# Patient Record
Sex: FEMALE | Race: WHITE | Employment: OTHER | URBAN - METROPOLITAN AREA
[De-identification: names, ages, dates, MRNs, and addresses within clinical notes are randomized per-mention and may not be internally consistent; named-entity substitution may affect disease eponyms.]

---

## 2021-01-01 ENCOUNTER — APPOINTMENT (OUTPATIENT)
Dept: CT IMAGING | Age: 76
DRG: 871 | End: 2021-01-01
Payer: MEDICARE

## 2021-01-01 ENCOUNTER — APPOINTMENT (OUTPATIENT)
Dept: MRI IMAGING | Age: 76
DRG: 871 | End: 2021-01-01
Payer: MEDICARE

## 2021-01-01 ENCOUNTER — APPOINTMENT (OUTPATIENT)
Dept: GENERAL RADIOLOGY | Age: 76
DRG: 871 | End: 2021-01-01
Payer: MEDICARE

## 2021-01-01 ENCOUNTER — HOSPITAL ENCOUNTER (INPATIENT)
Age: 76
LOS: 2 days | DRG: 871 | End: 2021-01-08
Attending: EMERGENCY MEDICINE | Admitting: FAMILY MEDICINE
Payer: MEDICARE

## 2021-01-01 VITALS
OXYGEN SATURATION: 48 % | HEIGHT: 67 IN | TEMPERATURE: 97 F | DIASTOLIC BLOOD PRESSURE: 43 MMHG | BODY MASS INDEX: 18.87 KG/M2 | RESPIRATION RATE: 24 BRPM | WEIGHT: 120.25 LBS | SYSTOLIC BLOOD PRESSURE: 94 MMHG | HEART RATE: 73 BPM

## 2021-01-01 DIAGNOSIS — I48.91 ATRIAL FIBRILLATION WITH RVR (HCC): Primary | ICD-10-CM

## 2021-01-01 LAB
AADO2: 424.7 MMHG
AADO2: 584.1 MMHG
ADENOVIRUS BY PCR: NOT DETECTED
ALBUMIN SERPL-MCNC: 1.8 G/DL (ref 3.5–4.7)
ALBUMIN SERPL-MCNC: 2 G/DL (ref 3.5–5.2)
ALP BLD-CCNC: 105 U/L (ref 35–104)
ALPHA-1-GLOBULIN: 0.6 G/DL (ref 0.2–0.4)
ALPHA-2-GLOBULIN: 1.2 G/FL (ref 0.5–1)
ALT SERPL-CCNC: 15 U/L (ref 0–32)
ANION GAP SERPL CALCULATED.3IONS-SCNC: 12 MMOL/L (ref 7–16)
ANION GAP SERPL CALCULATED.3IONS-SCNC: 8 MMOL/L (ref 7–16)
ANISOCYTOSIS: ABNORMAL
ANISOCYTOSIS: ABNORMAL
AST SERPL-CCNC: 25 U/L (ref 0–31)
B.E.: -8 MMOL/L (ref -3–3)
B.E.: -9 MMOL/L (ref -3–3)
BASOPHILS ABSOLUTE: 0 E9/L (ref 0–0.2)
BASOPHILS ABSOLUTE: 0.12 E9/L (ref 0–0.2)
BASOPHILS RELATIVE PERCENT: 0 % (ref 0–2)
BASOPHILS RELATIVE PERCENT: 0.6 % (ref 0–2)
BETA GLOBULIN: 0.7 G/DL (ref 0.8–1.3)
BILIRUB SERPL-MCNC: 0.5 MG/DL (ref 0–1.2)
BLOOD CULTURE, ROUTINE: ABNORMAL
BORDETELLA PARAPERTUSSIS BY PCR: NOT DETECTED
BORDETELLA PERTUSSIS BY PCR: NOT DETECTED
BUN BLDV-MCNC: 10 MG/DL (ref 8–23)
BUN BLDV-MCNC: 10 MG/DL (ref 8–23)
BURR CELLS: ABNORMAL
CALCIUM SERPL-MCNC: 8.8 MG/DL (ref 8.6–10.2)
CALCIUM SERPL-MCNC: 9.3 MG/DL (ref 8.6–10.2)
CHLAMYDOPHILIA PNEUMONIAE BY PCR: NOT DETECTED
CHLORIDE BLD-SCNC: 102 MMOL/L (ref 98–107)
CHLORIDE BLD-SCNC: 96 MMOL/L (ref 98–107)
CO2: 19 MMOL/L (ref 22–29)
CO2: 19 MMOL/L (ref 22–29)
COHB: 0.9 % (ref 0–1.5)
COHB: 0.9 % (ref 0–1.5)
CORONAVIRUS 229E BY PCR: NOT DETECTED
CORONAVIRUS HKU1 BY PCR: NOT DETECTED
CORONAVIRUS NL63 BY PCR: NOT DETECTED
CORONAVIRUS OC43 BY PCR: NOT DETECTED
CREAT SERPL-MCNC: 0.4 MG/DL (ref 0.5–1)
CREAT SERPL-MCNC: 0.6 MG/DL (ref 0.5–1)
CRITICAL: ABNORMAL
CRITICAL: ABNORMAL
CULTURE, BLOOD 2: ABNORMAL
DATE ANALYZED: ABNORMAL
DATE ANALYZED: ABNORMAL
DATE OF COLLECTION: ABNORMAL
DATE OF COLLECTION: ABNORMAL
DOHLE BODIES: ABNORMAL
EKG ATRIAL RATE: 208 BPM
EKG Q-T INTERVAL: 258 MS
EKG QRS DURATION: 86 MS
EKG QTC CALCULATION (BAZETT): 446 MS
EKG R AXIS: 86 DEGREES
EKG T AXIS: -41 DEGREES
EKG VENTRICULAR RATE: 180 BPM
ELECTROPHORESIS: ABNORMAL
EOSINOPHILS ABSOLUTE: 0 E9/L (ref 0.05–0.5)
EOSINOPHILS ABSOLUTE: 0.02 E9/L (ref 0.05–0.5)
EOSINOPHILS RELATIVE PERCENT: 0 % (ref 0–6)
EOSINOPHILS RELATIVE PERCENT: 0.1 % (ref 0–6)
FIO2: 100 %
FIO2: 80 %
GAMMA GLOBULIN: 2.6 G/DL (ref 0.7–1.6)
GFR AFRICAN AMERICAN: >60
GFR AFRICAN AMERICAN: >60
GFR NON-AFRICAN AMERICAN: >60 ML/MIN/1.73
GFR NON-AFRICAN AMERICAN: >60 ML/MIN/1.73
GLUCOSE BLD-MCNC: 131 MG/DL (ref 74–99)
GLUCOSE BLD-MCNC: 82 MG/DL (ref 74–99)
HAPTOGLOBIN: 320 MG/DL (ref 30–200)
HCO3: 16.8 MMOL/L (ref 22–26)
HCO3: 19.1 MMOL/L (ref 22–26)
HCT VFR BLD CALC: 27.7 % (ref 34–48)
HCT VFR BLD CALC: 30.3 % (ref 34–48)
HEMOGLOBIN: 10.7 G/DL (ref 11.5–15.5)
HEMOGLOBIN: 9.1 G/DL (ref 11.5–15.5)
HHB: 6.3 % (ref 0–5)
HHB: 8.8 % (ref 0–5)
HUMAN METAPNEUMOVIRUS BY PCR: NOT DETECTED
HUMAN RHINOVIRUS/ENTEROVIRUS BY PCR: NOT DETECTED
IGA: 24 MG/DL (ref 70–400)
IGG: 2772 MG/DL (ref 700–1600)
IGM: 48 MG/DL (ref 40–230)
IMMATURE GRANULOCYTES #: 0.16 E9/L
IMMATURE GRANULOCYTES %: 0.7 % (ref 0–5)
IMMUNOFIXATION RESULT, SERUM: NORMAL
INFLUENZA A BY PCR: NOT DETECTED
INFLUENZA B BY PCR: NOT DETECTED
L. PNEUMOPHILA SEROGP 1 UR AG: NORMAL
LAB: ABNORMAL
LAB: ABNORMAL
LACTATE DEHYDROGENASE: 209 U/L (ref 135–214)
LACTIC ACID: 1.1 MMOL/L (ref 0.5–2.2)
LACTIC ACID: 1.7 MMOL/L (ref 0.5–2.2)
LACTIC ACID: 2.3 MMOL/L (ref 0.5–2.2)
LYMPHOCYTES ABSOLUTE: 0.14 E9/L (ref 1.5–4)
LYMPHOCYTES ABSOLUTE: 0.5 E9/L (ref 1.5–4)
LYMPHOCYTES RELATIVE PERCENT: 1 % (ref 20–42)
LYMPHOCYTES RELATIVE PERCENT: 2.3 % (ref 20–42)
Lab: ABNORMAL
Lab: ABNORMAL
MCH RBC QN AUTO: 34.9 PG (ref 26–35)
MCH RBC QN AUTO: 36 PG (ref 26–35)
MCHC RBC AUTO-ENTMCNC: 32.9 % (ref 32–34.5)
MCHC RBC AUTO-ENTMCNC: 35.3 % (ref 32–34.5)
MCV RBC AUTO: 102 FL (ref 80–99.9)
MCV RBC AUTO: 106.1 FL (ref 80–99.9)
METAMYELOCYTES RELATIVE PERCENT: 3 % (ref 0–1)
METER GLUCOSE: 119 MG/DL (ref 74–99)
METHB: 0 % (ref 0–1.5)
METHB: 0.3 % (ref 0–1.5)
MODE: ABNORMAL
MODE: ABNORMAL
MONOCYTES ABSOLUTE: 0 E9/L (ref 0.1–0.95)
MONOCYTES ABSOLUTE: 0.17 E9/L (ref 0.1–0.95)
MONOCYTES RELATIVE PERCENT: 0 % (ref 2–12)
MONOCYTES RELATIVE PERCENT: 0.8 % (ref 2–12)
MYCOPLASMA PNEUMONIAE BY PCR: NOT DETECTED
MYELOCYTE PERCENT: 1 % (ref 0–0)
NEUTROPHILS ABSOLUTE: 14.16 E9/L (ref 1.8–7.3)
NEUTROPHILS ABSOLUTE: 20.38 E9/L (ref 1.8–7.3)
NEUTROPHILS RELATIVE PERCENT: 95 % (ref 43–80)
NEUTROPHILS RELATIVE PERCENT: 95.5 % (ref 43–80)
O2 CONTENT: 12.5 ML/DL
O2 CONTENT: 16.6 ML/DL
O2 SATURATION: 91.1 % (ref 92–98.5)
O2 SATURATION: 93.6 % (ref 92–98.5)
O2HB: 90 % (ref 94–97)
O2HB: 92.8 % (ref 94–97)
OPERATOR ID: 2485
OPERATOR ID: ABNORMAL
ORGANISM: ABNORMAL
ORGANISM: ABNORMAL
OVALOCYTES: ABNORMAL
PARAINFLUENZA VIRUS 1 BY PCR: NOT DETECTED
PARAINFLUENZA VIRUS 2 BY PCR: NOT DETECTED
PARAINFLUENZA VIRUS 3 BY PCR: NOT DETECTED
PARAINFLUENZA VIRUS 4 BY PCR: NOT DETECTED
PATIENT TEMP: 37 C
PATIENT TEMP: 37 C
PCO2: 35.7 MMHG (ref 35–45)
PCO2: 45.6 MMHG (ref 35–45)
PDW BLD-RTO: 15.6 FL (ref 11.5–15)
PDW BLD-RTO: 15.9 FL (ref 11.5–15)
PEEP/CPAP: 10 CMH2O
PEEP/CPAP: 4 CMH2O
PFO2: 0.68 MMHG/%
PFO2: 0.97 MMHG/%
PH BLOOD GAS: 7.24 (ref 7.35–7.45)
PH BLOOD GAS: 7.29 (ref 7.35–7.45)
PLATELET # BLD: 230 E9/L (ref 130–450)
PLATELET # BLD: 242 E9/L (ref 130–450)
PMV BLD AUTO: 10.2 FL (ref 7–12)
PMV BLD AUTO: 10.6 FL (ref 7–12)
PO2: 68.2 MMHG (ref 75–100)
PO2: 77.8 MMHG (ref 75–100)
POIKILOCYTES: ABNORMAL
POLYCHROMASIA: ABNORMAL
POTASSIUM REFLEX MAGNESIUM: 3.6 MMOL/L (ref 3.5–5)
POTASSIUM SERPL-SCNC: 3.1 MMOL/L (ref 3.5–5)
PRO-BNP: 2336 PG/ML (ref 0–125)
PROCALCITONIN: 3.04 NG/ML (ref 0–0.08)
PS: 12 CMH20
RBC # BLD: 2.61 E12/L (ref 3.5–5.5)
RBC # BLD: 2.97 E12/L (ref 3.5–5.5)
RESPIRATORY SYNCYTIAL VIRUS BY PCR: NOT DETECTED
RI(T): 546 %
RI(T): 856 %
RR MECHANICAL: 14 B/MIN
SARS-COV-2, NAAT: NOT DETECTED
SARS-COV-2, PCR: NOT DETECTED
SODIUM BLD-SCNC: 127 MMOL/L (ref 132–146)
SODIUM BLD-SCNC: 129 MMOL/L (ref 132–146)
SOURCE, BLOOD GAS: ABNORMAL
SOURCE, BLOOD GAS: ABNORMAL
STREP PNEUMONIAE ANTIGEN, URINE: NORMAL
THB: 12.7 G/DL (ref 11.5–16.5)
THB: 9.8 G/DL (ref 11.5–16.5)
TIME ANALYZED: 1326
TIME ANALYZED: 2301
TOTAL PROTEIN: 6.9 G/DL (ref 6.4–8.3)
TOTAL PROTEIN: 8.4 G/DL (ref 6.4–8.3)
TOXIC GRANULATION: ABNORMAL
TROPONIN: <0.01 NG/ML (ref 0–0.03)
VT MECHANICAL: 450 ML
WBC # BLD: 14.3 E9/L (ref 4.5–11.5)
WBC # BLD: 21.4 E9/L (ref 4.5–11.5)

## 2021-01-01 PROCEDURE — 2500000003 HC RX 250 WO HCPCS: Performed by: EMERGENCY MEDICINE

## 2021-01-01 PROCEDURE — 0202U NFCT DS 22 TRGT SARS-COV-2: CPT

## 2021-01-01 PROCEDURE — 6360000002 HC RX W HCPCS: Performed by: INTERNAL MEDICINE

## 2021-01-01 PROCEDURE — 83010 ASSAY OF HAPTOGLOBIN QUANT: CPT

## 2021-01-01 PROCEDURE — 2700000000 HC OXYGEN THERAPY PER DAY

## 2021-01-01 PROCEDURE — 2060000000 HC ICU INTERMEDIATE R&B

## 2021-01-01 PROCEDURE — 94640 AIRWAY INHALATION TREATMENT: CPT

## 2021-01-01 PROCEDURE — 82805 BLOOD GASES W/O2 SATURATION: CPT

## 2021-01-01 PROCEDURE — 6360000002 HC RX W HCPCS: Performed by: EMERGENCY MEDICINE

## 2021-01-01 PROCEDURE — 99285 EMERGENCY DEPT VISIT HI MDM: CPT

## 2021-01-01 PROCEDURE — U0002 COVID-19 LAB TEST NON-CDC: HCPCS

## 2021-01-01 PROCEDURE — 6360000002 HC RX W HCPCS: Performed by: FAMILY MEDICINE

## 2021-01-01 PROCEDURE — 99232 SBSQ HOSP IP/OBS MODERATE 35: CPT | Performed by: STUDENT IN AN ORGANIZED HEALTH CARE EDUCATION/TRAINING PROGRAM

## 2021-01-01 PROCEDURE — 2500000003 HC RX 250 WO HCPCS: Performed by: FAMILY MEDICINE

## 2021-01-01 PROCEDURE — 83605 ASSAY OF LACTIC ACID: CPT

## 2021-01-01 PROCEDURE — 6360000002 HC RX W HCPCS

## 2021-01-01 PROCEDURE — 2580000003 HC RX 258: Performed by: EMERGENCY MEDICINE

## 2021-01-01 PROCEDURE — 83880 ASSAY OF NATRIURETIC PEPTIDE: CPT

## 2021-01-01 PROCEDURE — 84145 PROCALCITONIN (PCT): CPT

## 2021-01-01 PROCEDURE — 6370000000 HC RX 637 (ALT 250 FOR IP)

## 2021-01-01 PROCEDURE — 36415 COLL VENOUS BLD VENIPUNCTURE: CPT

## 2021-01-01 PROCEDURE — 2580000003 HC RX 258: Performed by: INTERNAL MEDICINE

## 2021-01-01 PROCEDURE — 6370000000 HC RX 637 (ALT 250 FOR IP): Performed by: FAMILY MEDICINE

## 2021-01-01 PROCEDURE — 87449 NOS EACH ORGANISM AG IA: CPT

## 2021-01-01 PROCEDURE — 2580000003 HC RX 258: Performed by: FAMILY MEDICINE

## 2021-01-01 PROCEDURE — 96366 THER/PROPH/DIAG IV INF ADDON: CPT

## 2021-01-01 PROCEDURE — 87077 CULTURE AEROBIC IDENTIFY: CPT

## 2021-01-01 PROCEDURE — 84165 PROTEIN E-PHORESIS SERUM: CPT

## 2021-01-01 PROCEDURE — 84484 ASSAY OF TROPONIN QUANT: CPT

## 2021-01-01 PROCEDURE — 2000000000 HC ICU R&B

## 2021-01-01 PROCEDURE — 2500000003 HC RX 250 WO HCPCS

## 2021-01-01 PROCEDURE — 71275 CT ANGIOGRAPHY CHEST: CPT

## 2021-01-01 PROCEDURE — 94660 CPAP INITIATION&MGMT: CPT

## 2021-01-01 PROCEDURE — 6360000002 HC RX W HCPCS: Performed by: SPECIALIST

## 2021-01-01 PROCEDURE — 87186 SC STD MICRODIL/AGAR DIL: CPT

## 2021-01-01 PROCEDURE — 85025 COMPLETE CBC W/AUTO DIFF WBC: CPT

## 2021-01-01 PROCEDURE — 99222 1ST HOSP IP/OBS MODERATE 55: CPT | Performed by: STUDENT IN AN ORGANIZED HEALTH CARE EDUCATION/TRAINING PROGRAM

## 2021-01-01 PROCEDURE — 94664 DEMO&/EVAL PT USE INHALER: CPT

## 2021-01-01 PROCEDURE — 82962 GLUCOSE BLOOD TEST: CPT

## 2021-01-01 PROCEDURE — 96365 THER/PROPH/DIAG IV INF INIT: CPT

## 2021-01-01 PROCEDURE — 6360000002 HC RX W HCPCS: Performed by: STUDENT IN AN ORGANIZED HEALTH CARE EDUCATION/TRAINING PROGRAM

## 2021-01-01 PROCEDURE — 6360000004 HC RX CONTRAST MEDICATION: Performed by: RADIOLOGY

## 2021-01-01 PROCEDURE — APPSS60 APP SPLIT SHARED TIME 46-60 MINUTES: Performed by: PHYSICIAN ASSISTANT

## 2021-01-01 PROCEDURE — 2500000003 HC RX 250 WO HCPCS: Performed by: INTERNAL MEDICINE

## 2021-01-01 PROCEDURE — 51702 INSERT TEMP BLADDER CATH: CPT

## 2021-01-01 PROCEDURE — 6370000000 HC RX 637 (ALT 250 FOR IP): Performed by: STUDENT IN AN ORGANIZED HEALTH CARE EDUCATION/TRAINING PROGRAM

## 2021-01-01 PROCEDURE — 80048 BASIC METABOLIC PNL TOTAL CA: CPT

## 2021-01-01 PROCEDURE — 2580000003 HC RX 258: Performed by: SPECIALIST

## 2021-01-01 PROCEDURE — 2500000003 HC RX 250 WO HCPCS: Performed by: STUDENT IN AN ORGANIZED HEALTH CARE EDUCATION/TRAINING PROGRAM

## 2021-01-01 PROCEDURE — 83615 LACTATE (LD) (LDH) ENZYME: CPT

## 2021-01-01 PROCEDURE — 71045 X-RAY EXAM CHEST 1 VIEW: CPT

## 2021-01-01 PROCEDURE — 72148 MRI LUMBAR SPINE W/O DYE: CPT

## 2021-01-01 PROCEDURE — 87081 CULTURE SCREEN ONLY: CPT

## 2021-01-01 PROCEDURE — 80053 COMPREHEN METABOLIC PANEL: CPT

## 2021-01-01 PROCEDURE — 86334 IMMUNOFIX E-PHORESIS SERUM: CPT

## 2021-01-01 PROCEDURE — 87040 BLOOD CULTURE FOR BACTERIA: CPT

## 2021-01-01 PROCEDURE — 82784 ASSAY IGA/IGD/IGG/IGM EACH: CPT

## 2021-01-01 PROCEDURE — 93005 ELECTROCARDIOGRAM TRACING: CPT | Performed by: EMERGENCY MEDICINE

## 2021-01-01 RX ORDER — BUDESONIDE AND FORMOTEROL FUMARATE DIHYDRATE 160; 4.5 UG/1; UG/1
2 AEROSOL RESPIRATORY (INHALATION) 2 TIMES DAILY
Status: DISCONTINUED | OUTPATIENT
Start: 2021-01-01 | End: 2021-01-01 | Stop reason: CLARIF

## 2021-01-01 RX ORDER — HYDROCODONE BITARTRATE AND ACETAMINOPHEN 5; 325 MG/1; MG/1
2 TABLET ORAL EVERY 6 HOURS PRN
Status: DISCONTINUED | OUTPATIENT
Start: 2021-01-01 | End: 2021-01-01 | Stop reason: HOSPADM

## 2021-01-01 RX ORDER — DIPHENHYDRAMINE HYDROCHLORIDE 50 MG/ML
50 INJECTION INTRAMUSCULAR; INTRAVENOUS ONCE
Status: COMPLETED | OUTPATIENT
Start: 2021-01-01 | End: 2021-01-01

## 2021-01-01 RX ORDER — SODIUM CHLORIDE 0.9 % (FLUSH) 0.9 %
10 SYRINGE (ML) INJECTION EVERY 12 HOURS SCHEDULED
Status: DISCONTINUED | OUTPATIENT
Start: 2021-01-01 | End: 2021-01-01 | Stop reason: HOSPADM

## 2021-01-01 RX ORDER — FAMOTIDINE 20 MG/1
20 TABLET, FILM COATED ORAL 2 TIMES DAILY
Status: DISCONTINUED | OUTPATIENT
Start: 2021-01-01 | End: 2021-01-01 | Stop reason: HOSPADM

## 2021-01-01 RX ORDER — LORAZEPAM 2 MG/ML
0.5 INJECTION INTRAMUSCULAR EVERY 30 MIN PRN
Status: DISCONTINUED | OUTPATIENT
Start: 2021-01-01 | End: 2021-01-01 | Stop reason: HOSPADM

## 2021-01-01 RX ORDER — ARFORMOTEROL TARTRATE 15 UG/2ML
15 SOLUTION RESPIRATORY (INHALATION) 2 TIMES DAILY
Status: DISCONTINUED | OUTPATIENT
Start: 2021-01-01 | End: 2021-01-01 | Stop reason: HOSPADM

## 2021-01-01 RX ORDER — ONDANSETRON 2 MG/ML
4 INJECTION INTRAMUSCULAR; INTRAVENOUS EVERY 6 HOURS PRN
Status: DISCONTINUED | OUTPATIENT
Start: 2021-01-01 | End: 2021-01-01 | Stop reason: HOSPADM

## 2021-01-01 RX ORDER — BUDESONIDE 0.5 MG/2ML
0.5 INHALANT ORAL 2 TIMES DAILY
Status: DISCONTINUED | OUTPATIENT
Start: 2021-01-01 | End: 2021-01-01 | Stop reason: HOSPADM

## 2021-01-01 RX ORDER — FENTANYL CITRATE 50 UG/ML
50 INJECTION, SOLUTION INTRAMUSCULAR; INTRAVENOUS ONCE
Status: COMPLETED | OUTPATIENT
Start: 2021-01-01 | End: 2021-01-01

## 2021-01-01 RX ORDER — POLYETHYLENE GLYCOL 3350 17 G/17G
17 POWDER, FOR SOLUTION ORAL DAILY PRN
Status: DISCONTINUED | OUTPATIENT
Start: 2021-01-01 | End: 2021-01-01 | Stop reason: HOSPADM

## 2021-01-01 RX ORDER — POTASSIUM CHLORIDE 20 MEQ/1
40 TABLET, EXTENDED RELEASE ORAL 2 TIMES DAILY WITH MEALS
Status: DISPENSED | OUTPATIENT
Start: 2021-01-01 | End: 2021-01-01

## 2021-01-01 RX ORDER — PROMETHAZINE HYDROCHLORIDE 25 MG/1
12.5 TABLET ORAL EVERY 6 HOURS PRN
Status: DISCONTINUED | OUTPATIENT
Start: 2021-01-01 | End: 2021-01-01 | Stop reason: HOSPADM

## 2021-01-01 RX ORDER — POTASSIUM CHLORIDE 7.45 MG/ML
10 INJECTION INTRAVENOUS PRN
Status: DISCONTINUED | OUTPATIENT
Start: 2021-01-01 | End: 2021-01-01 | Stop reason: HOSPADM

## 2021-01-01 RX ORDER — DILTIAZEM HYDROCHLORIDE 5 MG/ML
10 INJECTION INTRAVENOUS ONCE
Status: COMPLETED | OUTPATIENT
Start: 2021-01-01 | End: 2021-01-01

## 2021-01-01 RX ORDER — FAMOTIDINE 20 MG/1
TABLET, FILM COATED ORAL
Status: COMPLETED
Start: 2021-01-01 | End: 2021-01-01

## 2021-01-01 RX ORDER — 0.9 % SODIUM CHLORIDE 0.9 %
1000 INTRAVENOUS SOLUTION INTRAVENOUS ONCE
Status: COMPLETED | OUTPATIENT
Start: 2021-01-01 | End: 2021-01-01

## 2021-01-01 RX ORDER — IPRATROPIUM BROMIDE AND ALBUTEROL SULFATE 2.5; .5 MG/3ML; MG/3ML
1 SOLUTION RESPIRATORY (INHALATION) EVERY 4 HOURS PRN
Status: DISCONTINUED | OUTPATIENT
Start: 2021-01-01 | End: 2021-01-01 | Stop reason: HOSPADM

## 2021-01-01 RX ORDER — METOPROLOL TARTRATE 5 MG/5ML
5 INJECTION INTRAVENOUS EVERY 5 MIN PRN
Status: DISCONTINUED | OUTPATIENT
Start: 2021-01-01 | End: 2021-01-01 | Stop reason: HOSPADM

## 2021-01-01 RX ORDER — METOPROLOL TARTRATE 5 MG/5ML
5 INJECTION INTRAVENOUS ONCE
Status: COMPLETED | OUTPATIENT
Start: 2021-01-01 | End: 2021-01-01

## 2021-01-01 RX ORDER — SODIUM CHLORIDE 0.9 % (FLUSH) 0.9 %
10 SYRINGE (ML) INJECTION PRN
Status: DISCONTINUED | OUTPATIENT
Start: 2021-01-01 | End: 2021-01-01 | Stop reason: HOSPADM

## 2021-01-01 RX ORDER — DEXTROSE, SODIUM CHLORIDE, AND POTASSIUM CHLORIDE 5; .45; .15 G/100ML; G/100ML; G/100ML
INJECTION INTRAVENOUS CONTINUOUS
Status: DISCONTINUED | OUTPATIENT
Start: 2021-01-01 | End: 2021-01-01

## 2021-01-01 RX ORDER — VERAPAMIL HYDROCHLORIDE 120 MG/1
120 TABLET, FILM COATED ORAL 3 TIMES DAILY
COMMUNITY

## 2021-01-01 RX ORDER — ACETAMINOPHEN 650 MG/1
650 SUPPOSITORY RECTAL EVERY 6 HOURS PRN
Status: DISCONTINUED | OUTPATIENT
Start: 2021-01-01 | End: 2021-01-01 | Stop reason: HOSPADM

## 2021-01-01 RX ORDER — METOPROLOL TARTRATE 5 MG/5ML
INJECTION INTRAVENOUS
Status: COMPLETED
Start: 2021-01-01 | End: 2021-01-01

## 2021-01-01 RX ORDER — VERAPAMIL HYDROCHLORIDE 240 MG/1
360 TABLET, FILM COATED, EXTENDED RELEASE ORAL DAILY
COMMUNITY

## 2021-01-01 RX ORDER — LOSARTAN POTASSIUM 100 MG/1
100 TABLET ORAL DAILY
COMMUNITY

## 2021-01-01 RX ORDER — HYDROCODONE BITARTRATE AND ACETAMINOPHEN 5; 325 MG/1; MG/1
1 TABLET ORAL EVERY 6 HOURS PRN
Status: DISCONTINUED | OUTPATIENT
Start: 2021-01-01 | End: 2021-01-01 | Stop reason: HOSPADM

## 2021-01-01 RX ORDER — LORAZEPAM 2 MG/ML
0.25 INJECTION INTRAMUSCULAR ONCE
Status: COMPLETED | OUTPATIENT
Start: 2021-01-01 | End: 2021-01-01

## 2021-01-01 RX ORDER — POTASSIUM CHLORIDE 29.8 MG/ML
20 INJECTION INTRAVENOUS PRN
Status: DISCONTINUED | OUTPATIENT
Start: 2021-01-01 | End: 2021-01-01 | Stop reason: ALTCHOICE

## 2021-01-01 RX ORDER — MONTELUKAST SODIUM 10 MG/1
10 TABLET ORAL NIGHTLY
Status: DISCONTINUED | OUTPATIENT
Start: 2021-01-01 | End: 2021-01-01 | Stop reason: HOSPADM

## 2021-01-01 RX ORDER — FENTANYL CITRATE 50 UG/ML
INJECTION, SOLUTION INTRAMUSCULAR; INTRAVENOUS
Status: COMPLETED
Start: 2021-01-01 | End: 2021-01-01

## 2021-01-01 RX ORDER — POTASSIUM CHLORIDE AND SODIUM CHLORIDE 900; 300 MG/100ML; MG/100ML
INJECTION, SOLUTION INTRAVENOUS CONTINUOUS
Status: DISCONTINUED | OUTPATIENT
Start: 2021-01-01 | End: 2021-01-01 | Stop reason: HOSPADM

## 2021-01-01 RX ORDER — ZOLPIDEM TARTRATE 5 MG/1
5 TABLET ORAL NIGHTLY PRN
Status: DISCONTINUED | OUTPATIENT
Start: 2021-01-01 | End: 2021-01-01 | Stop reason: HOSPADM

## 2021-01-01 RX ORDER — ACETAMINOPHEN 325 MG/1
650 TABLET ORAL EVERY 6 HOURS PRN
Status: DISCONTINUED | OUTPATIENT
Start: 2021-01-01 | End: 2021-01-01 | Stop reason: HOSPADM

## 2021-01-01 RX ADMIN — POTASSIUM CHLORIDE 10 MEQ: 7.46 INJECTION, SOLUTION INTRAVENOUS at 22:09

## 2021-01-01 RX ADMIN — METOPROLOL TARTRATE 5 MG: 5 INJECTION INTRAVENOUS at 12:06

## 2021-01-01 RX ADMIN — Medication 10 ML: at 12:05

## 2021-01-01 RX ADMIN — POTASSIUM CHLORIDE, DEXTROSE MONOHYDRATE AND SODIUM CHLORIDE: 150; 5; 450 INJECTION, SOLUTION INTRAVENOUS at 09:50

## 2021-01-01 RX ADMIN — DEXTROSE MONOHYDRATE 10 MG/HR: 50 INJECTION, SOLUTION INTRAVENOUS at 00:28

## 2021-01-01 RX ADMIN — ACETAMINOPHEN 650 MG: 325 TABLET ORAL at 12:37

## 2021-01-01 RX ADMIN — LORAZEPAM 0.25 MG: 2 INJECTION INTRAMUSCULAR; INTRAVENOUS at 23:19

## 2021-01-01 RX ADMIN — ARFORMOTEROL TARTRATE 15 MCG: 15 SOLUTION RESPIRATORY (INHALATION) at 20:04

## 2021-01-01 RX ADMIN — AZITHROMYCIN DIHYDRATE 500 MG: 500 INJECTION, POWDER, LYOPHILIZED, FOR SOLUTION INTRAVENOUS at 18:59

## 2021-01-01 RX ADMIN — ARFORMOTEROL TARTRATE 15 MCG: 15 SOLUTION RESPIRATORY (INHALATION) at 20:20

## 2021-01-01 RX ADMIN — IOPAMIDOL 75 ML: 755 INJECTION, SOLUTION INTRAVENOUS at 09:03

## 2021-01-01 RX ADMIN — ENOXAPARIN SODIUM 50 MG: 60 INJECTION SUBCUTANEOUS at 22:08

## 2021-01-01 RX ADMIN — ENOXAPARIN SODIUM 40 MG: 40 INJECTION SUBCUTANEOUS at 10:09

## 2021-01-01 RX ADMIN — IPRATROPIUM BROMIDE AND ALBUTEROL SULFATE 1 AMPULE: .5; 3 SOLUTION RESPIRATORY (INHALATION) at 20:02

## 2021-01-01 RX ADMIN — MORPHINE SULFATE 1 MG/HR: 10 INJECTION INTRAVENOUS at 01:17

## 2021-01-01 RX ADMIN — MONTELUKAST SODIUM 10 MG: 10 TABLET, FILM COATED ORAL at 00:28

## 2021-01-01 RX ADMIN — Medication 10 ML: at 10:32

## 2021-01-01 RX ADMIN — FAMOTIDINE 20 MG: 20 TABLET, FILM COATED ORAL at 10:09

## 2021-01-01 RX ADMIN — ARFORMOTEROL TARTRATE 15 MCG: 15 SOLUTION RESPIRATORY (INHALATION) at 12:28

## 2021-01-01 RX ADMIN — DIPHENHYDRAMINE HYDROCHLORIDE 50 MG: 50 INJECTION, SOLUTION INTRAMUSCULAR; INTRAVENOUS at 14:14

## 2021-01-01 RX ADMIN — METOPROLOL TARTRATE 5 MG: 5 INJECTION INTRAVENOUS at 09:01

## 2021-01-01 RX ADMIN — FENTANYL CITRATE 50 MCG: 50 INJECTION, SOLUTION INTRAMUSCULAR; INTRAVENOUS at 16:12

## 2021-01-01 RX ADMIN — DEXTROSE MONOHYDRATE 15 MG/HR: 50 INJECTION, SOLUTION INTRAVENOUS at 12:47

## 2021-01-01 RX ADMIN — BUDESONIDE 500 MCG: 0.5 SUSPENSION RESPIRATORY (INHALATION) at 07:46

## 2021-01-01 RX ADMIN — POTASSIUM CHLORIDE 10 MEQ: 7.46 INJECTION, SOLUTION INTRAVENOUS at 17:13

## 2021-01-01 RX ADMIN — VANCOMYCIN HYDROCHLORIDE 1000 MG: 1 INJECTION, POWDER, LYOPHILIZED, FOR SOLUTION INTRAVENOUS at 12:04

## 2021-01-01 RX ADMIN — POTASSIUM CHLORIDE 10 MEQ: 7.46 INJECTION, SOLUTION INTRAVENOUS at 18:11

## 2021-01-01 RX ADMIN — BUDESONIDE 500 MCG: 0.5 SUSPENSION RESPIRATORY (INHALATION) at 20:19

## 2021-01-01 RX ADMIN — ENOXAPARIN SODIUM 40 MG: 40 INJECTION SUBCUTANEOUS at 10:31

## 2021-01-01 RX ADMIN — BUDESONIDE 500 MCG: 0.5 SUSPENSION RESPIRATORY (INHALATION) at 20:04

## 2021-01-01 RX ADMIN — POTASSIUM CHLORIDE AND SODIUM CHLORIDE: 900; 300 INJECTION, SOLUTION INTRAVENOUS at 14:52

## 2021-01-01 RX ADMIN — POTASSIUM CHLORIDE, DEXTROSE MONOHYDRATE AND SODIUM CHLORIDE: 150; 5; 450 INJECTION, SOLUTION INTRAVENOUS at 18:58

## 2021-01-01 RX ADMIN — DEXTROSE MONOHYDRATE 15 MG/HR: 50 INJECTION, SOLUTION INTRAVENOUS at 15:17

## 2021-01-01 RX ADMIN — SODIUM CHLORIDE 1000 ML: 9 INJECTION, SOLUTION INTRAVENOUS at 05:49

## 2021-01-01 RX ADMIN — Medication 10 ML: at 22:09

## 2021-01-01 RX ADMIN — ARFORMOTEROL TARTRATE 15 MCG: 15 SOLUTION RESPIRATORY (INHALATION) at 07:47

## 2021-01-01 RX ADMIN — PIPERACILLIN SODIUM AND TAZOBACTAM SODIUM 4.5 G: 4; .5 INJECTION, POWDER, LYOPHILIZED, FOR SOLUTION INTRAVENOUS at 09:48

## 2021-01-01 RX ADMIN — BUDESONIDE 500 MCG: 0.5 SUSPENSION RESPIRATORY (INHALATION) at 12:28

## 2021-01-01 RX ADMIN — DEXTROSE MONOHYDRATE 5 MG/HR: 50 INJECTION, SOLUTION INTRAVENOUS at 06:22

## 2021-01-01 RX ADMIN — POTASSIUM CHLORIDE 10 MEQ: 7.46 INJECTION, SOLUTION INTRAVENOUS at 20:32

## 2021-01-01 RX ADMIN — HYDROCODONE BITARTRATE AND ACETAMINOPHEN 2 TABLET: 5; 325 TABLET ORAL at 20:42

## 2021-01-01 RX ADMIN — SODIUM CHLORIDE 0.2 MCG/KG/HR: 9 INJECTION, SOLUTION INTRAVENOUS at 15:39

## 2021-01-01 RX ADMIN — CEFTRIAXONE 2 G: 2 INJECTION, POWDER, FOR SOLUTION INTRAMUSCULAR; INTRAVENOUS at 14:52

## 2021-01-01 RX ADMIN — DILTIAZEM HYDROCHLORIDE 10 MG: 5 INJECTION INTRAVENOUS at 06:22

## 2021-01-01 RX ADMIN — METOPROLOL TARTRATE 25 MG: 25 TABLET, FILM COATED ORAL at 14:04

## 2021-01-01 RX ADMIN — FAMOTIDINE 20 MG: 20 TABLET, FILM COATED ORAL at 10:31

## 2021-01-01 RX ADMIN — LORAZEPAM 0.5 MG: 2 INJECTION INTRAMUSCULAR; INTRAVENOUS at 01:33

## 2021-01-01 RX ADMIN — POTASSIUM CHLORIDE 40 MEQ: 20 TABLET, EXTENDED RELEASE ORAL at 10:31

## 2021-01-01 RX ADMIN — SODIUM CHLORIDE 1.4 MCG/KG/HR: 9 INJECTION, SOLUTION INTRAVENOUS at 20:15

## 2021-01-01 ASSESSMENT — PAIN SCALES - GENERAL
PAINLEVEL_OUTOF10: 0

## 2021-01-06 PROBLEM — J96.01 ACUTE RESPIRATORY FAILURE WITH HYPOXIA (HCC): Status: ACTIVE | Noted: 2021-01-01

## 2021-01-06 PROBLEM — E87.1 HYPONATREMIA: Status: ACTIVE | Noted: 2021-01-01

## 2021-01-06 PROBLEM — R77.1 HYPERGLOBULINEMIA: Status: ACTIVE | Noted: 2021-01-01

## 2021-01-06 PROBLEM — J44.9 COPD (CHRONIC OBSTRUCTIVE PULMONARY DISEASE) (HCC): Chronic | Status: ACTIVE | Noted: 2021-01-01

## 2021-01-06 PROBLEM — M54.50 ACUTE RIGHT-SIDED LOW BACK PAIN WITHOUT SCIATICA: Status: ACTIVE | Noted: 2021-01-01

## 2021-01-06 PROBLEM — E44.0 MODERATE PROTEIN-CALORIE MALNUTRITION (HCC): Status: ACTIVE | Noted: 2021-01-01

## 2021-01-06 PROBLEM — J18.9 PNEUMONIA: Status: ACTIVE | Noted: 2021-01-01

## 2021-01-06 PROBLEM — I48.91 ATRIAL FIBRILLATION WITH RVR (HCC): Status: ACTIVE | Noted: 2021-01-01

## 2021-01-06 PROBLEM — M48.54XA: Status: ACTIVE | Noted: 2021-01-01

## 2021-01-06 NOTE — CONSULTS
Inpatient Cardiology Consultation      Reason for Consult:  Atrial fibrillation     Consulting Physician: Dr Andrew Saleh     Requesting Physician:  Dr Danna Cogan    Date of Consultation: 1/6/2021    HISTORY OF PRESENT ILLNESS:   Ms Vanessa Palumbo is a 69 yo female who is not previously known to [facility]. She was previously seen by Dr Jessica Preciado in 2012, but has not been following with since. Past medical history includes orthostatic hypotension, hypothyroidism, asthma, previous tobacco abuse. Presented to [facility] 1/6/2021 with weakness   VS: [id number]%RA-123/65   Labs: WBC 14.3, H&H 10.7/30.3, Plt 230. Na 127, K+ 3.6, BUN/SCr 10/0.6, glucose 82. Albumin 2.0,   Troponin negative, proBNP 2336      CXR diffuse extensive right sided pneumonia   CTA chest negative for pulmonary embolism, extensive airspace disease consistent with pneumonia right upper and middle lobes, emphysema, small to moderate right pleural effusion, mild coronary calcifications     She was given IV saline bolus and Cardizem 10 mg IV and started on a drip. Cardiology was asked to see the patient for atrial fibrillation. She states that for the last week for she has had increased shortness of breath with an associated dry cough. She states that she developed a fever last night of 102 which made her come to the ED. She states that she is normally very active and does not have this shortness of breath. She admits to associated palpitations, lightheadedness and dizziness. She denies any falls, LOC, chest pain, orthopnea, PND, peripheral edema. Please note: past medical records were reviewed per electronic medical record (EMR) - see detailed reports under Past Medical/ Surgical History. Past Medical and Surgical History:    1. Tilt table test 12/20/2012 : positive orthostatics   2. Hypertension  3. Hypothyroidism  4. Asthma   5. Previous tobacco abuse   6.  S/p cholecystectomy, hysterectomy,     Medications Prior to admit:  Prior to Admission medications    Medication Sig Start Date End Date Taking? Authorizing Provider   verapamil (CALAN) 120 MG tablet Take 120 mg by mouth 3 times daily   Yes Historical Provider, MD   verapamil (CALAN SR) 240 MG extended release tablet Take 360 mg by mouth daily   Yes Historical Provider, MD   losartan (COZAAR) 100 MG tablet Take 100 mg by mouth daily   Yes Historical Provider, MD   ibuprofen (ADVIL;MOTRIN) 600 MG tablet Take 600 mg by mouth as needed  8/18/15  Yes Historical Provider, MD   Calcium Carbonate-Vitamin D (OSCAL 500/200 D-3 PO) Take 500 mg by mouth 2 times daily. Yes Historical Provider, MD   budesonide-formoterol (SYMBICORT) 160-4.5 MCG/ACT AERO Inhale 2 puffs into the lungs 2 times daily. Yes Historical Provider, MD   montelukast (SINGULAIR) 10 MG tablet Take 10 mg by mouth nightly. Yes Historical Provider, MD   fexofenadine (ALLEGRA) 60 MG tablet Take 60 mg by mouth 2 times daily as needed. Yes Historical Provider, MD   albuterol (PROVENTIL HFA;VENTOLIN HFA) 108 (90 BASE) MCG/ACT inhaler Inhale 2 puffs into the lungs every 6 hours as needed.      Yes Historical Provider, MD       Current Medications:    Current Facility-Administered Medications: dilTIAZem 100 mg in dextrose 5 % 100 mL infusion (ADD-[place]), 5 mg/hr, Intravenous, Continuous  vancomycin (VANCOCIN) 20 mg/kg in dextrose 5 % 250 mL IVPB, 20 mg/kg, Intravenous, Once  piperacillin-tazobactam (ZOSYN) 4.5 g in dextrose 5 % 100 mL IVPB (mini-bag), 4.5 g, Intravenous, Once  ipratropium-albuterol (DUONEB) nebulizer solution 1 ampule, 1 ampule, Inhalation, Q4H PRN  montelukast (SINGULAIR) tablet 10 mg, 10 mg, Oral, Nightly  zolpidem (AMBIEN) tablet 5 mg, 5 mg, Oral, Nightly PRN  sodium chloride flush 0.9 % injection 10 mL, 10 mL, Intravenous, 2 times per day  sodium chloride flush 0.9 % injection 10 mL, 10 mL, Intravenous, PRN  enoxaparin (LOVENOX) injection 40 mg, 40 mg, Subcutaneous, Daily  promethazine (PHENERGAN) tablet 12.5 mg, 12.5 mg, Oral, Q6H PRN **OR** ondansetron (ZOFRAN) injection 4 mg, 4 mg, Intravenous, Q6H PRN  polyethylene glycol (GLYCOLAX) packet 17 g, 17 g, Oral, Daily PRN  acetaminophen (TYLENOL) tablet 650 mg, 650 mg, Oral, Q6H PRN **OR** acetaminophen (TYLENOL) suppository 650 mg, 650 mg, Rectal, Q6H PRN  famotidine (PEPCID) tablet 20 mg, 20 mg, Oral, BID  HYDROcodone-acetaminophen (NORCO) 5-325 MG per tablet 1 tablet, 1 tablet, Oral, Q6H PRN  HYDROcodone-acetaminophen (NORCO) 5-325 MG per tablet 2 tablet, 2 tablet, Oral, Q6H PRN  budesonide (PULMICORT) nebulizer suspension 500 mcg, 0.5 mg, Nebulization, BID  Arformoterol Tartrate (BROVANA) nebulizer solution 15 mcg, 15 mcg, Nebulization, BID    Allergies: Avelox [moxifloxacin hcl in nacl], Ceclor [cefaclor], and Codeine    Social History:   Lives with her partner in a 3 story home [place] a [place] farm. She is regularly very active - gardening, outdoor work and repurposing old furniture. She denies any chest pain or shortness of breath with her ADL. Previous tobacco abuse: quit 10 years ago, 30 pack year history   Denies alcohol or illicit drug use     Full code       Family History: This information was not obtained at this time as it is found noncontributory secondary to the patients advanced age. REVIEW OF SYSTEMS:     · Constitutional: Denies  chills, night sweats, + fevers,fatigue  · HEENT: Denies headaches, nose bleeds, and blurred vision,oral pain, abscess or lesion. · Musculoskeletal: Denies falls, pain to BLE with ambulation and edema to BLE. · Neurological: +dizziness and lightheadedness ,denies  numbness and tingling  · Cardiovascular: Denies chest pain, + palpitations, and feelings of heart racing. · Respiratory: Denies orthopnea and PND, + cough, GARCIA  · Gastrointestinal: Denies heartburn, nausea/vomiting, diarrhea and constipation, black/bloody, and tarry stools.    · Genitourinary: Denies dysuria and hematuria  · Hematologic: Denies excessive

## 2021-01-06 NOTE — ED PROVIDER NOTES
HPI:  1/6/21,   Time: 6:07 AM WALESKA Palencia is a 70 y.o. female presenting to the ED for multiple complaints. Patient admits to generalized weakness which has been ongoing for the past several days. Patient states she began having some diarrhea which began yesterday as well as shortness of breath which began yesterday. Patient states she did note a fever at home yesterday as well. Patient denies cough, chest pain, abdominal pain, vomiting,, or other complaints. ROS:   GEN: + fevers, no chills, + fatique  HENT: No trauma, no sore throat, no swelling throat or oral mucosa  EYES: No changes in vision, no pain  CARDIO: No chest pain, no palpitations  PULM: See HPI  ABD: No pain, no nausea, no vomiting. + Diarrhea  MSK: No pain, no trauma, no deformities  SKIN: No rashes, no abrasions, no lacerations  NEURO: No changes in mentation, no headache, no weakness       --------------------------------------------- PAST HISTORY ---------------------------------------------  Past Medical History:  has a past medical history of Asthma and Thyroid disease. Past Surgical History:  has a past surgical history that includes Cholecystectomy; Hysterectomy; Thyroid surgery; and Dental surgery. Social History:  reports that she quit smoking about 11 years ago. She has never used smokeless tobacco. She reports that she does not drink alcohol or use drugs. Family History: family history is not on file. The patients home medications have been reviewed. Allergies:  Avelox [moxifloxacin hcl in nacl], Ceclor [cefaclor], and Codeine    -------------------------------------------------- RESULTS -------------------------------------------------  All laboratory and radiology results have been personally reviewed by myself   LABS:  Results for orders placed or performed during the hospital encounter of 01/06/21   CBC Auto Differential   Result Value Ref Range    WBC 14.3 (H) 4.5 - 11.5 E9/L    RBC 2.97 (L) 3.50 - 5.50 E12/L    Hemoglobin 10.7 (L) 11.5 - 15.5 g/dL    Hematocrit 30.3 (L) 34.0 - 48.0 %    .0 (H) 80.0 - 99.9 fL    MCH 36.0 (H) 26.0 - 35.0 pg    MCHC 35.3 (H) 32.0 - 34.5 %    RDW 15.6 (H) 11.5 - 15.0 fL    Platelets 258 573 - 305 E9/L    MPV 10.2 7.0 - 12.0 fL    Neutrophils % 95.0 (H) 43.0 - 80.0 %    Lymphocytes % 1.0 (L) 20.0 - 42.0 %    Monocytes % 0.0 (L) 2.0 - 12.0 %    Eosinophils % 0.0 0.0 - 6.0 %    Basophils % 0.0 0.0 - 2.0 %    Neutrophils Absolute 14.16 (H) 1.80 - 7.30 E9/L    Lymphocytes Absolute 0.14 (L) 1.50 - 4.00 E9/L    Monocytes Absolute 0.00 (L) 0.10 - 0.95 E9/L    Eosinophils Absolute 0.00 (L) 0.05 - 0.50 E9/L    Basophils Absolute 0.00 0.00 - 0.20 E9/L    Metamyelocytes Relative 3.0 (H) 0.0 - 1.0 %    Myelocyte Percent 1.0 0 - 0 %    Anisocytosis 1+    Comprehensive Metabolic Panel w/ Reflex to MG   Result Value Ref Range    Sodium 127 (L) 132 - 146 mmol/L    Potassium reflex Magnesium 3.6 3.5 - 5.0 mmol/L    Chloride 96 (L) 98 - 107 mmol/L    CO2 19 (L) 22 - 29 mmol/L    Anion Gap 12 7 - 16 mmol/L    Glucose 82 74 - 99 mg/dL    BUN 10 8 - 23 mg/dL    CREATININE 0.6 0.5 - 1.0 mg/dL    GFR Non-African American >60 >=60 mL/min/1.73    GFR African American >60     Calcium 9.3 8.6 - 10.2 mg/dL    Total Protein 8.4 (H) 6.4 - 8.3 g/dL    Alb 2.0 (L) 3.5 - 5.2 g/dL    Total Bilirubin 0.5 0.0 - 1.2 mg/dL    Alkaline Phosphatase 105 (H) 35 - 104 U/L    ALT 15 0 - 32 U/L    AST 25 0 - 31 U/L       RADIOLOGY:  Interpreted by Radiologist.  XR CHEST PORTABLE    (Results Pending)       ------------------------- NURSING NOTES AND VITALS REVIEWED ---------------------------   The nursing notes within the ED encounter and vital signs as below have been reviewed.    /65   Pulse 112   Temp 97.7 °F (36.5 °C) (Oral)   Resp 16   SpO2 94%   Oxygen Saturation Interpretation: Normal    ---------------------------------------------------PHYSICAL EXAM--------------------------------------    GEN: No acute distress, well-appearing, well nourished   HENT: Normocephalic, atraumatic, oral mucosa moist  EYES: No scleral injection, no scleral icterus, PERRL   PULM: Lungs clear to ascultation bilaterally, no wheezes, no crackles. + Mild respiratory distress with conversational dyspnea  CARDIO: + Tachycardic, + irregular rhythm, normal S1/S2  ABD: Soft, non-tender, no rigidity  MSK: No deformities, no edema, palpable pulses all extremities   SKIN: No rashes, no lacerations, no abrasions   NEURO: Awake, alert, appropriate         ------------------------------ ED COURSE/MEDICAL DECISION MAKING----------------------  Medications   0.9 % sodium chloride bolus (1,000 mLs Intravenous New Bag 1/6/21 0549)   dilTIAZem 100 mg in dextrose 5 % 100 mL infusion (ADD-[place]) (5 mg/hr Intravenous New Bag 1/6/21 0622)   dilTIAZem injection 10 mg (10 mg Intravenous Given 1/6/21 0622)         ED Course:   6:13 AM EST  Results of EKG noted, patient in new onset atrial fibrillation with RVR, she denies known history of atrial fibrillation. Will start patient on Cardizem at this time with bolus started by infusion. We will continue to monitor closely. 6:45 AM EST  Patient seen and reexamined, resting comfortably, patient on Cardizem infusion, patient now sinus rhythm with a rate approximately  on Cardizem infusion with occasional unsustained runs of atrial fibrillation. Patient informed of work-up results, hospital mission recommended for ongoing evaluation and management, patient agreeable. Covid test pending. Medical Decision Making:   Patient presents with generalized fatigue shortness of breath and fever. Patient found to be in A. fib new onset with RVR, patient responded appropriately to Cardizem bolus followed by Cardizem infusion. Given fever and cough patient was tested for Covid, results pending. Patient admitted for ongoing evaluation and management of her new onset A. fib with RVR. --------------------------------- ADDITIONAL PROVIDER NOTES ---------------------------------        EKG Interpretation  Interpreted by emergency department physician    Rhythm: atrial fibrillation - rapid  Rate: 180  Axis: normal  Conduction: normal  ST Segments: nonspecific changes  T Waves: non specific changes    Clinical Impression: atrial fibrillation (new onset)  Comparison to prior EKG: changes compared to previous EKG          This patient's ED course included: re-evaluation prior to disposition, IV medications, cardiac monitoring and a personal history and physicial eaxmination    This patient has remained hemodynamically stable and improved during their ED course. Counseling: The emergency provider has spoken with the patient and discussed todays results, in addition to providing specific details for the plan of care and counseling regarding the diagnosis and prognosis. Questions are answered at this time and they are agreeable with the plan.      --------------------------------- IMPRESSION AND DISPOSITION ---------------------------------    IMPRESSION  1.  Atrial fibrillation with RVR ([address]        DISPOSITION  Disposition: Admit to telemetry  Patient condition is fair        Sherlyn Jara DO  01/06/21 9729

## 2021-01-06 NOTE — ED NOTES
Patient was seen and examined. She is in A. fib RVR and a respiratory failure. X-ray was reviewed showing right-sided white out. A CTA of the chest was ordered. I did prescribe antibiotics for the patient's after blood cultures were drawn. CTA of the chest was ordered and we are waiting results I did discuss the patient with Dr. Jade Lund who will admit.      Rossy Cannon,   01/06/21 8074

## 2021-01-06 NOTE — CONSULTS
Department of Medicine  Division of Hematology/Oncology  Attending Consult Note      Reason for Consult:  Suspicion for MM, Evaluation of elevated protein, bone pain  Requesting Physician:      CHIEF COMPLAINT:  Generalized weakness/ diarrhea and SOB    History Obtained From:  Patient and EMR    HISTORY OF PRESENT ILLNESS:      The patient is a 70 y.o. female with significant past medical history of asthma, COPD, HTN who presents with generalized weakness for the past week, diarrhea and sob. Labs on admission show WBC 14.3, Hemoglobin 10.7, Hematocrit 30.3, .0, platelets 194,, BNP 2336, total protein 8.4, Alb 2.0. CXR showed diffuse R-sided airspace disease most consistent with extensive pneumonia. CTA pulmonary showed no evidence of PE, BL airspace disease, emphysema, small to moderate pleural effusions, moderate compression demormity of the inferior endplate of J46 with some irregularity of the endplate, likely findings of old fracture with degenerative disc disease. She was also found to be in atrial fibrillation in ED, was started on Cardizem drip and cardiology was consulted. Upon evaluation, patient is awake and alert sitting in the bed. She states that she developed a fever of 102 last night which made her come into the hospital. She is on 15L HF NC currently. She states that she has smoked for at least 40 years and continues off and on. Denies alcohol use. She reports family history of her mother having a neuroblastoma. She feels like she has been very healthy these past years gardening and making sure that she stays busy at home. She said she complains of some lower back pain but that's it. She denies generalized bone pain, no night sweats, no headaches, chest pain or SOB. She states she had an MRI this past december that showed spinal stenosis.      Past Medical History:        Diagnosis Date    Asthma     Thyroid disease        Past Surgical History:        Procedure Laterality Date    CHOLECYSTECTOMY      DENTAL SURGERY      to prepared for bridge to be placed    HYSTERECTOMY      THYROID SURGERY         Current Medications:    Current Facility-Administered Medications: dilTIAZem 100 mg in dextrose 5 % 100 mL infusion ([facility]), 5 mg/hr, Intravenous, Continuous  ipratropium-albuterol (DUONEB) nebulizer solution 1 ampule, 1 ampule, Inhalation, Q4H PRN  montelukast (SINGULAIR) tablet 10 mg, 10 mg, Oral, Nightly  zolpidem (AMBIEN) tablet 5 mg, 5 mg, Oral, Nightly PRN  sodium chloride flush 0.9 % injection 10 mL, 10 mL, Intravenous, 2 times per day  sodium chloride flush 0.9 % injection 10 mL, 10 mL, Intravenous, PRN  enoxaparin (LOVENOX) injection 40 mg, 40 mg, Subcutaneous, Daily  promethazine (PHENERGAN) tablet 12.5 mg, 12.5 mg, Oral, Q6H PRN **OR** ondansetron (ZOFRAN) injection 4 mg, 4 mg, Intravenous, Q6H PRN  polyethylene glycol (GLYCOLAX) packet 17 g, 17 g, Oral, Daily PRN  acetaminophen (TYLENOL) tablet 650 mg, 650 mg, Oral, Q6H PRN **OR** acetaminophen (TYLENOL) suppository 650 mg, 650 mg, Rectal, Q6H PRN  famotidine (PEPCID) tablet 20 mg, 20 mg, Oral, BID  HYDROcodone-acetaminophen (NORCO) 5-325 MG per tablet 1 tablet, 1 tablet, Oral, Q6H PRN  HYDROcodone-acetaminophen (NORCO) 5-325 MG per tablet 2 tablet, 2 tablet, Oral, Q6H PRN  budesonide (PULMICORT) nebulizer suspension 500 mcg, 0.5 mg, Nebulization, BID  Arformoterol Tartrate (BROVANA) nebulizer solution 15 mcg, 15 mcg, Nebulization, BID  metoprolol (LOPRESSOR) injection 5 mg, 5 mg, Intravenous, Q5 Min PRN    Allergies:   Avelox [moxifloxacin hcl in nacl], Codeine, and Ceclor [cefaclor]    Social History:   Social History     Socioeconomic History    Marital status:      Spouse name: Not on file    Number of children: Not on file    Years of education: Not on file    Highest education level: Not on file   Occupational History    Not on file   Social Needs    Financial resource strain: Not on file    Food insecurity     Worry: Not on file     Inability: Not on file    Transportation needs     Medical: Not on file     Non-medical: Not on file   Tobacco Use    Smoking status: Former Smoker     Quit date: 2010     Years since quittin.0    Smokeless tobacco: Never Used   Substance and Sexual Activity    Alcohol use: No    Drug use: No    Sexual activity: Not on file   Lifestyle    Physical activity     Days per week: Not on file     Minutes per session: Not on file    Stress: Not on file   Relationships    Social connections     Talks on phone: Not on file     Gets together: Not on file     Attends Advent service: Not on file     Active member of club or organization: Not on file     Attends meetings of clubs or organizations: Not on file     Relationship status: Not on file    Intimate partner violence     Fear of current or ex partner: Not on file     Emotionally abused: Not on file     Physically abused: Not on file     Forced sexual activity: Not on file   Other Topics Concern    Not on file   Social History Narrative    Not on file       Family History:     History reviewed. No pertinent family history.     REVIEW OF SYSTEMS:    CONSTITUTIONAL:  negative for fatigue, fevers, chills, sweats and weight loss  HEENT:  negative for hearing loss, epistaxis and sore throat  RESPIRATORY:  negative for cough with sputum, dyspnea, wheezing, hemoptysis and chest pain  CARDIOVASCULAR:  negative for chest pain, palpitations, PND, edema, syncope, dyspnea  GASTROINTESTINAL:  negative for nausea, vomiting, diarrhea, constipation, abdominal pain, jaundice, reflux, hematemesis and hemtochezia  GENITOURINARY:  negative for frequency, dysuria and urinary incontinence  INTEGUMENT/BREAST:  negative for rash and pruritus  HEMATOLOGIC/LYMPHATIC:  negative for easy bruising, bleeding, lymphadenopathy and petechiae  MUSCULOSKELETAL:  negative for myalgias, arthralgias and pain  NEUROLOGICAL:  negative for headaches,+ dizziness, seizures, gait problems and syncope  BEHAVIOR/PSYCH:  negative for depressed mood and anxiety    PHYSICAL EXAM:      Vitals:  /83   Pulse 103   Temp 98.2 °F (36.8 °C)   Resp 20   Ht 5' 7\" (1.702 m)   Wt 118 lb (53.5 kg)   SpO2 95%   BMI 18.48 kg/m²     CONSTITUTIONAL:  awake, alert, cooperative, no apparent distress, and appears stated age,  HEENT:  Normocepalic, atraumatic, no obvious abnormality. Lids and lashes normal, PERRLA, EOMI, sclera clear, conjunctiva normal  NECK:  Supple, full ROM, symmetrical, trachea midline, no adenopathy, thyroid symmetric, not enlarged and no tenderness, skin normal  HEMATOLOGIC/LYMPHATICS:  no cervical or supraclavicular lymphadenopathy  LUNGS: increased work of breathing,crackles noted BL, Diminished breath sounds, +expiratory wheeze  CARDIOVASCULAR:  Normal apical impulse, regular rate and rhythm, normal S1 and S2, no S3 or S4, and no murmur noted  ABDOMEN:  No scars, normal BS, soft, non-distended, non-tender, no masses palpated, no hepatosplenomegaly  MUSCULOSKELETAL:  No redness, warmth, or swelling of the joints; motor strength is 5 out of 5 in all extremities bilaterally; tone is normal  NEUROLOGIC:  Awake, alert, oriented to name, place and time. Cranial nerves II-XII are grossly intact. Motor is 5 out of 5 bilaterally. Sensory is intact.       DATA:      CMP:    Lab Results   Component Value Date     01/06/2021    K 3.6 01/06/2021    CL 96 01/06/2021    CO2 19 01/06/2021    BUN 10 01/06/2021    PROT 8.4 01/06/2021       CBC:    Recent Labs     01/06/21  0541   WBC 14.3*   HGB 10.7*          Radiology:    Xr Chest Portable    Result Date: 1/6/2021  EXAMINATION: ONE XRAY VIEW OF THE CHEST 1/6/2021 7:11 am COMPARISON: 02/18/2012 HISTORY: ORDERING SYSTEM PROVIDED HISTORY: cough SOB TECHNOLOGIST PROVIDED HISTORY: Reason for exam:->cough SOB FINDINGS: There is diffuse airspace disease throughout the right lung most prominent at the right apex in the right base. There is an associated small right-sided pleural effusion. The left lung remains grossly clear. Diffuse right-sided airspace disease most consistent with extensive pneumonia. Cta Pulmonary W Contrast    Addendum Date: 1/6/2021    ADDENDUM: In addition to the previously described findings, there is moderate compression deformity of the inferior endplate of Y17. There is some irregularity of the inferior endplate of V67. Some vacuum phenomenon is seen in the T12-L1 disc space with spurring. The findings are most likely related to an old compression fracture and degenerative disease. The possibility of a recent fracture or even discitis cannot be completely excluded. Result Date: 1/6/2021  EXAMINATION: CTA OF THE CHEST 1/6/2021 8:52 am TECHNIQUE: CTA of the chest was performed after the administration of intravenous contrast.  Multiplanar reformatted images are provided for review. MIP images are provided for review. Dose modulation, iterative reconstruction, and/or weight based adjustment of the mA/kV was utilized to reduce the radiation dose to as low as reasonably achievable. COMPARISON: Portable chest dated 01/06/2021 HISTORY: ORDERING SYSTEM PROVIDED HISTORY: Eval of pneumonia, rule out PE TECHNOLOGIST PROVIDED HISTORY: Reason for exam:->Eval of pneumonia, rule out PE SOB FINDINGS: Pulmonary Arteries: Pulmonary arteries are adequately opacified for evaluation. No evidence of intraluminal filling defect to suggest pulmonary embolism. Main pulmonary artery is normal in caliber. Mediastinum: There is mild mediastinal lymphadenopathy, including a distal right paratracheal node measuring 1.3 cm in short axis on series 302, image 84 and precarinal node measuring 1.4 cm in short axis on image 92. These are likely reactive. The heart and pericardium demonstrate no acute abnormality. There is no acute abnormality of the thoracic aorta.   Scattered calcified plaque is seen in the thoracic and upper abdominal aorta. There are mild coronary artery calcifications which is a marker for coronary atherosclerotic disease. Lungs/pleura: There is dense consolidation with air bronchograms involving almost all the right upper and middle lobes, consistent with pneumonia. Small amount of aerated lung is seen at the right apex. Some patchy areas of airspace opacity are also seen in the right lower lobe, consistent with pneumonia. There is a small to moderate right pleural effusion, likely parapneumonic. Emphysematous changes are noted, most severe at the apices with chronic interstitial disease. Small areas of airspace opacity are also seen in the anteromedial left upper lobe and lingula, likely infectious. No definite pulmonary mass is seen. There is no evidence of left pleural effusion or pneumothorax. Upper Abdomen: Limited images of the upper abdomen are unremarkable. Soft Tissues/Bones: No acute bone or soft tissue abnormality. 1. No evidence of pulmonary embolism. 2. Extensive bilateral airspace disease including severe consolidation of the right upper and middle lobes, consistent with pneumonia. 3. Emphysema. Chronic interstitial changes. 4. Small to moderate right pleural effusion. 5. Mild coronary artery calcifications. IMPRESSION: Moderate compression deformity of the inferior endplate of K05 with some irregularity of the endplate. Findings are most likely related to the old fracture with degenerative disc disease. If clinically indicated, MRI of the thoracic spine could be done to exclude the possibility of recent fracture or discitis/osteomyelitis. IMPRESSION:     74-year-old female with history of tobacco use presents with generalized weakness found to have possible spinal fracture. We were subsequently consulted for evaluation of evaluated protein and bone pain.        RECOMMENDATIONS:  She does not complain of widespread body aches, mostly located in her lower

## 2021-01-06 NOTE — H&P
Dago Bermeo History and Physical      CHIEF COMPLAINT:  Weakness and fever    History Obtained From:  Patient    HISTORY OF PRESENT ILLNESS:    The patient is a 70 y.o. female with significant past medical history of asthma, HTN and nicotine abuse who presents with having right low back pain the past 3 weeks. For the past week or 2 she has felt weak and off balance. Yesterday she experienced worsening of weakness and shortness of breath and had diarrhea and a fever with temp up to 102. She presented to the ER and was found to have hypoxemia and a fib with a very rapid rate. She has been treated with cardizem drip and heart rate has decreased. She is maintaining oxygen saturation with high flow oxygen by nasal cannula. Past Medical History:     has a past medical history of Asthma and Thyroid disease. There is a history of vasovagal syncope for which she has seen Dr. Judson Gould. Past Surgical History:     has a past surgical history that includes Cholecystectomy; Hysterectomy; Thyroid surgery; and Dental surgery. Medications Prior to Admission:    Not in a hospital admission. Allergies: Avelox [moxifloxacin hcl in nacl], Codeine, and Ceclor [cefaclor]    Social History:    reports that she quit smoking about 11 years ago. She has never used smokeless tobacco. She reports that she does not drink alcohol or use drugs. She is a retired nurse. She lives with her significant other. Family History:   History reviewed. No pertinent family history.     REVIEW OF SYSTEMS:  CONSTITUTIONAL:  positive for  fevers, fatigue and malaise  RESPIRATORY:  positive for  dyspnea  CARDIOVASCULAR:  negative for  chest pain, edema, syncope  GASTROINTESTINAL:  positive for diarrhea and constipation  GENITOURINARY:  negative for frequency, dysuria and hematuria  MUSCULOSKELETAL:  positive for  Right lumbosacral pain  NEUROLOGICAL:  negative for headaches, memory problems, speech problems, dysphagia and numbness    PHYSICAL EXAM:  Vitals:  BP (!) 167/76   Pulse 116   Temp 98.4 °F (36.9 °C)   Resp 20   Ht 5' 7\" (1.702 m)   Wt 118 lb (53.5 kg)   SpO2 91%   BMI 18.48 kg/m²   CONSTITUTIONAL:  awake, alert, cooperative, appears to be somewhat short of breath at rest, and appears stated age  EYES:  Lids and lashes normal, pupils equal, round and reactive to light, extra ocular muscles intact, sclera clear, conjunctiva normal  ENT:  Normocephalic, without obvious abnormality, atraumatic, sinuses nontender on palpation, external ears without lesions, oral pharynx with moist mucus membranes, tonsils without erythema or exudates, gums normal and good dentition. NECK:  Supple, symmetrical, trachea midline, no adenopathy, thyroid symmetric, not enlarged and no tenderness, skin normal  HEMATOLOGIC/LYMPHATICS:  no cervical lymphadenopathy  BACK:  Symmetric, no curvature, spinous processes are non-tender on palpation, paraspinous muscles are non-tender on palpation, no costal vertebral tenderness  LUNGS:  Decreased breath sounds of right lung fields with dullness to percussion of right chest.  No crackles or wheezing  CARDIOVASCULAR:  Irregular rhythm,  rapid rate, no murmurs, no gallops, no rubs  ABDOMEN:  Flat, soft, non-tender, no HSM, no masses, no guarding, no rebound tenderness  CHEST/BREASTS:  No chest tenderness  GENITAL/URINARY:  deferred  MUSCULOSKELETAL:  No spinal or paraspinal tenderness, no cyanosis, no edema, no clubbing  NEUROLOGIC:  Awake, alert, oriented to name, place and time. Cranial nerves II-XII are grossly intact. Motor is 5 out of 5 bilaterally. Cerebellar finger to nose, heel to shin intact. Sensory is intact.   Babinski down going, Romberg negative, and gait is normal.  SKIN:  no bruising or bleeding and no rashes    DATA:  EKG:  A fib with RVR  CBC with Differential:    Lab Results   Component Value Date    WBC 14.3 01/06/2021    RBC 2.97 01/06/2021    HGB 10.7 01/06/2021    HCT 30.3 01/06/2021     01/06/2021    .0 01/06/2021    MCH 36.0 01/06/2021    MCHC 35.3 01/06/2021    RDW 15.6 01/06/2021    SEGSPCT 63 02/21/2012    METASPCT 3.0 01/06/2021    LYMPHOPCT 1.0 01/06/2021    MONOPCT 0.0 01/06/2021    MYELOPCT 1.0 01/06/2021    BASOPCT 0.0 01/06/2021    MONOSABS 0.00 01/06/2021    LYMPHSABS 0.14 01/06/2021    EOSABS 0.00 01/06/2021    BASOSABS 0.00 01/06/2021     CMP:    Lab Results   Component Value Date     01/06/2021    K 3.6 01/06/2021    CL 96 01/06/2021    CO2 19 01/06/2021    BUN 10 01/06/2021    CREATININE 0.6 01/06/2021    GFRAA >60 01/06/2021    LABGLOM >60 01/06/2021    GLUCOSE 82 01/06/2021    GLUCOSE 114 02/21/2012    PROT 8.4 01/06/2021    LABALBU 2.0 01/06/2021    LABALBU 3.2 02/20/2012    CALCIUM 9.3 01/06/2021    BILITOT 0.5 01/06/2021    ALKPHOS 105 01/06/2021    AST 25 01/06/2021    ALT 15 01/06/2021     PT/INR:  No results found for: PROTIME, INR  Last 3 Troponin:    Lab Results   Component Value Date    TROPONINI <0.01 01/06/2021    TROPONINI <0.01 02/19/2012    TROPONINI <0.01 02/18/2012    CKTOTAL 51 02/19/2012    CKTOTAL 59 02/18/2012    CKMB 0.9 02/18/2012     TSH:    Lab Results   Component Value Date    TSH 0.756 02/19/2012     Radiology Review:  CTA of chest with dense right-sided infiltrates, no masses visualized, no emboli, and compression of inferior endplate of G-46    ASSESSMENT AND PLAN:    Patient Active Problem List    Diagnosis Date Noted    Acute respiratory failure with hypoxia ([address] 01/06/2021     Priority: High    Atrial fibrillation with RVR ([facility] 01/06/2021     Priority: High    Pneumonia 01/06/2021     Priority: High    COPD (chronic obstructive pulmonary disease) ([facility] 01/06/2021     Priority: High    Hyperglobulinemia 01/06/2021     Priority: High    Syncope 02/20/2012     Priority: High    Hyponatremia 01/06/2021     Priority: Medium    Moderate protein-calorie malnutrition ([facility] 01/06/2021     Priority: Medium    Non-traumatic compression fracture of T12 thoracic vertebra, initial encounter ([facility] 01/06/2021     Priority: Medium    Acute right-sided low back pain without sciatica 01/06/2021     Priority: Medium    Asthma 02/20/2012     Priority: Medium         Admit the patient. Continue antibiotics for treatment of pneumonia and continue Cardizem drip. Consult with pulmonologist regarding management of hypoxic respiratory failure and pneumonia. Consult with cardiologist for management of a fib with RVR. Consult with oncologist regarding evaluation of hyperglobulinemia, anemia, and possible bony lesions. MRI of T12 and lumbar spine to delineate if bony lesions are present and regarding cause of the severe right low back pain.

## 2021-01-06 NOTE — PROGRESS NOTES
Placed patient on BIPAP 14/6 70%. damian became tachypnic and said she felt like she \"had no air\". Took off and placed on NRB. Told her that we could try again later.

## 2021-01-06 NOTE — CONSULTS
Vidal Balbuena M.D.,[facility]  Bhavesh Bell D.O., F.A.TARA.ODEMETRIUS., Beto Madrid M.D. Genaro Cummings M.D., Gibran Lakhani M.D. Jordan Lemus D.O. Patient:  Greg Alcantara 70 y.o. female MRN: [id number]     Date of Service: 1/6/2021      PULMONARY CONSULTATION    Reason for Consultation: Management of pneumonia and acute respiratory failure with hypoxia  Referring Physician: Dr. Jenifer Montano with the referring physician will be sent via the electronic medical record. Chief Complaint: Generalized fatigue and weakness and diarrhea shortness of breath    CODE STATUS: Full code    SUBJECTIVE:  HPI:  Greg Alcantara is a 70 y.o. female with a past medical history significant for asthma, COPD and hypertension who presents to the emergency department with a chief complaint of generalized fatigue and weakness and diarrhea which has been going on off and on since December 14. Patient tells me that she had low back pain on December 14 tried topical remedies with no improvement. Apparently at some point she was also treated with a Medrol pack. Yesterday she noticed having significant shortness of breath with exertion and also having diarrhea. She presented to the emergency department and had a CTA which showed extensive bilateral airspace disease with severe consolidation of the right upper and middle lobe. Emphysema changes. Also moderate compression deformity of the endplate of K78. She was also found to be in A. fib RVR and currently is on a Cardizem drip. Patient was found to have leukocytosis with a white BC of 14.3 has macrocytic anemia with an H&H of 10.7/30.3 she is significantly hypoxic currently on 15 L high flow nasal cannula saturating 88 to 89%. He denies any palpitations or cough. He denies any aspiration.       Past Medical History:   Diagnosis Date    Asthma     Thyroid disease        Past Surgical History:   Procedure Laterality Date    CHOLECYSTECTOMY  DENTAL SURGERY      to prepared for bridge to be placed    HYSTERECTOMY      THYROID SURGERY         History reviewed. No pertinent family history. Social History:   Social History     Socioeconomic History    Marital status:      Spouse name: Not on file    Number of children: Not on file    Years of education: Not on file    Highest education level: Not on file   Occupational History    Not on file   Social Needs    Financial resource strain: Not on file    Food insecurity     Worry: Not on file     Inability: Not on file    Transportation needs     Medical: Not on file     Non-medical: Not on file   Tobacco Use    Smoking status: Former Smoker     Quit date: 2010     Years since quittin.0    Smokeless tobacco: Never Used   Substance and Sexual Activity    Alcohol use: No    Drug use: No    Sexual activity: Not on file   Lifestyle    Physical activity     Days per week: Not on file     Minutes per session: Not on file    Stress: Not on file   Relationships    Social connections     Talks on phone: Not on file     Gets together: Not on file     Attends Mosque service: Not on file     Active member of club or organization: Not on file     Attends meetings of clubs or organizations: Not on file     Relationship status: Not on file    Intimate partner violence     Fear of current or ex partner: Not on file     Emotionally abused: Not on file     Physically abused: Not on file     Forced sexual activity: Not on file   Other Topics Concern    Not on file   Social History Narrative    Not on file     Smoking history: The patient has been smoking off and on for almost 40 years of her life. Currently she smokes 1 to 2 cigarettes a day. ETOH:   reports no history of alcohol use. Exposures: There  is not history of TB or TB exposure. There is not asbestos or silica dust exposure. The patient reports does not have coal, foundry, quarry or [employer] exposure.   Recent travel history none. There is not  history of recreational or IV drug use. There is not hot tub exposure. The patient has a cat. Vaccines: There is no immunization history on file for this patient. Home Meds: Not in a hospital admission. CURRENT MEDS :  Scheduled Meds:   montelukast  10 mg Oral Nightly    sodium chloride flush  10 mL Intravenous 2 times per day    enoxaparin  40 mg Subcutaneous Daily    famotidine  20 mg Oral BID    budesonide  0.5 mg Nebulization BID    Arformoterol Tartrate  15 mcg Nebulization BID       Continuous Infusions:   dilTIAZem 15 mg/hr (01/06/21 1517)       Allergies   Allergen Reactions    Avelox [Moxifloxacin Hcl In Nacl] Hives, Swelling and Rash    Codeine Shortness Of Breath and Rash    Ceclor [Cefaclor]        REVIEW OF SYSTEMS:  Constitutional: Positive for fatigue and weakness  Skin: Denies pigmentation, dark lesions, and rashes   HEENT: Denies hearing loss, tinnitus, ear drainage, epistaxis, sore throat, and hoarseness. Cardiovascular: Denies palpitations, chest pain, and chest pressure. Respiratory: Denies cough, dyspnea at rest, hemoptysis, apnea, and choking.   Gastrointestinal: Denies nausea, vomiting, poor appetite, diarrhea, heartburn or reflux  Genitourinary: Denies dysuria, frequency, urgency or hematuria  Musculoskeletal: Denies myalgias, muscle weakness, and bone pain  Neurological: Denies dizziness, vertigo, headache, and focal weakness  Psychological: Denies anxiety and depression  Endocrine: Denies heat intolerance and cold intolerance  Hematopoietic/Lymphatic: Denies bleeding problems and blood transfusions    OBJECTIVE:   BP (!) 167/76   Pulse 116   Temp 98.4 °F (36.9 °C)   Resp 20   Ht 5' 7\" (1.702 m)   Wt 118 lb (53.5 kg)   SpO2 91%   BMI 18.48 kg/m²   SpO2 Readings from Last 1 Encounters:   01/06/21 91%        I/O:  No intake or output data in the 24 hours ending 01/06/21 1626  Vent Information  SpO2: 91 % Physical Exam:  General: Patient is sitting up in the ER bed [place]. Mild distress tachypneic. HEENT: Pupils are equal round and reactive to light, there are no oral lesions and no post-nasal drip   Neck: supple without adenopathy  Cardiovascular: regular rate and rhythm without murmur or gallop  Respiratory: She has coarse rhonchi on the right side abdomen: soft, non-tender, non-distended, normal bowel sounds  Extremities: warm, no edema, no clubbing  Skin: no rash or lesion  Neurologic: CN II-XII grossly intact, no focal deficits    Pulmonary Function Testing personally reviewed and interpreted      Imaging personally reviewed:    Impression   1. No evidence of pulmonary embolism. 2. Extensive bilateral airspace disease including severe consolidation of the   right upper and middle lobes, consistent with pneumonia. 3. Emphysema.  Chronic interstitial changes. 4. Small to moderate right pleural effusion. 5. Mild coronary artery calcifications. IMPRESSION:   Moderate compression deformity of the inferior endplate of B96 with some   irregularity of the endplate.  Findings are most likely related to the old   fracture with degenerative disc disease.  If clinically indicated, MRI of the   thoracic spine could be done to exclude the possibility of recent fracture or   discitis/osteomyelitis.        Echo:      Labs:  Lab Results   Component Value Date    WBC 14.3 01/06/2021    HGB 10.7 01/06/2021    HCT 30.3 01/06/2021    .0 01/06/2021    MCH 36.0 01/06/2021    MCHC 35.3 01/06/2021    RDW 15.6 01/06/2021     01/06/2021    MPV 10.2 01/06/2021     Lab Results   Component Value Date     01/06/2021    K 3.6 01/06/2021    CL 96 01/06/2021    CO2 19 01/06/2021    BUN 10 01/06/2021    CREATININE 0.6 01/06/2021    LABALBU 2.0 01/06/2021    LABALBU 3.2 02/20/2012    CALCIUM 9.3 01/06/2021    GFRAA >60 01/06/2021    LABGLOM >60 01/06/2021     No results found for: PROTIME, INR  Recent Labs 01/06/21  0541   PROBNP 2,336*     Recent Labs     01/06/21  0541   TROPONINI <0.01     No results for input(s): PROCAL in the last 72 hours. This SmartLink has not been configured with any valid records. Micro:  No results for input(s): CULTRESP in the last 72 hours. No results for input(s): LABGRAM in the last 72 hours. No results for input(s): LEGUR in the last 72 hours. No results for input(s): STREPNEUMAGU in the last 72 hours. No results for input(s): LP1UAG in the last 72 hours. Assessment:  1. Acute hypoxic respiratory failure  2. Multilobar necrotizing pneumonia  3. A. fib RVR  4. COPD emphysema unknown severity  5. Macrocytic anemia  6. Hyponatremia    Plan:  1. Oxygen requirements are high. Currently on 15 L nasal cannula high flow saturating 88 to 90%. Will order a noninvasive ventilator for lung recruitment and increasing PEEP. 2. Patient received Zosyn and vancomycin in the ER will recommend to continue for now. Considering she is hyponatremia and had diarrhea with extensive multilobar pneumonia concerning for Legionella . Patient has significant allergies to Levaquin and he gets hives and rash therefore we will start her on azithromycin. 3. Will check air strep and Legionella urinary antigens. Respiratory cultures and a respiratory viral panel  4. Continue bronchodilators she currently is on Brovana and Pulmicort we will add Xopenex considering her A. fib RVR  5. Recommend to consult infectious disease for further recommendations regarding antibiotics  6. Otology oncology has been on board for evaluation for possible multiple myeloma  7. A. fib management per cardiology      Thank you for allowing me to participate in the care of Ja Burton. Please feel free to call with questions. Electronically signed by Gamaliel Bryson MD on 1/6/2021 at 4:26 PM      Note: This report was completed utilizing computer voice recognition software.  Every effort has been made to ensure accuracy, however; inadvertent computerized transcription errors may be present

## 2021-01-07 PROBLEM — J13 PNEUMONIA DUE TO STREPTOCOCCUS PNEUMONIAE (HCC): Status: ACTIVE | Noted: 2021-01-01

## 2021-01-07 PROBLEM — J96.02 ACUTE RESPIRATORY FAILURE WITH HYPOXIA AND HYPERCAPNIA (HCC): Status: ACTIVE | Noted: 2021-01-01

## 2021-01-07 NOTE — PROGRESS NOTES
Admit Date: 1/6/2021    Subjective:     Patient states she feel like she is suffocating with the mask. She coughs more, but it is non-productive. Her low back pain is manageable. Scheduled Meds:   montelukast  10 mg Oral Nightly    sodium chloride flush  10 mL Intravenous 2 times per day    enoxaparin  40 mg Subcutaneous Daily    famotidine  20 mg Oral BID    budesonide  0.5 mg Nebulization BID    Arformoterol Tartrate  15 mcg Nebulization BID    azithromycin  500 mg Intravenous Q24H     Continuous Infusions:   dilTIAZem 15 mg/hr (01/07/21 0556)    dextrose 5% and 0.45% NaCl with KCl 20 mEq 75 mL/hr at 01/06/21 1858     PRN Meds:ipratropium-albuterol, zolpidem, sodium chloride flush, promethazine **OR** ondansetron, polyethylene glycol, acetaminophen **OR** acetaminophen, HYDROcodone 5 mg - acetaminophen, HYDROcodone 5 mg - acetaminophen, metoprolol      Objective:     No intake/output data recorded. No intake/output data recorded. BP (!) 136/59   Pulse 100   Temp 98 °F (36.7 °C) (Axillary)   Resp (!) 35   Ht 5' 7\" (1.702 m)   Wt 120 lb 4 oz (54.5 kg)   SpO2 90%   BMI 18.83 kg/m²     LUNGS:  Crackles in right anterior and posterior fields. No wheezing. CARDIOVASCULAR:  RRR with no murmurs, no gallops, no rubs. ABDOMEN:  Flat, soft, non-tender. MUSCULOSKELETAL:  No cyanosis, no edema, no clubbing.      Data Review  CBC:   Recent Labs     01/06/21  0541 01/07/21 0411   WBC 14.3* 21.4*   HGB 10.7* 9.1*    242     BMP:    Recent Labs     01/06/21  0541 01/07/21 0411   * 129*   K 3.6 3.1*   CL 96* 102   CO2 19* 19*   BUN 10 10   CREATININE 0.6 0.4*   GLUCOSE 82 131*     Coagulation: No results found for: INR, APTT  Cardiac markers:   Lab Results   Component Value Date    CKMB 0.9 02/18/2012     Lab Results   Component Value Date    LABALBU 2.0 (L) 01/06/2021     Lab Results   Component Value Date    ALT 15 01/06/2021    AST 25 01/06/2021    ALKPHOS 105 (H) 01/06/2021    BILITOT 0.5 01/06/2021         Assessment:     Patient Active Problem List    Diagnosis Date Noted    Acute respiratory failure with hypoxia ([facility] 01/06/2021     Priority: High    Atrial fibrillation with RVR ([facility] 01/06/2021     Priority: High    Pneumonia 01/06/2021     Priority: High    COPD (chronic obstructive pulmonary disease) ([facility] 01/06/2021     Priority: High    Hyperglobulinemia 01/06/2021     Priority: High    Syncope 02/20/2012     Priority: High    Hyponatremia 01/06/2021     Priority: Medium    Moderate protein-calorie malnutrition ([facility] 01/06/2021     Priority: Medium    Non-traumatic compression fracture of T12 thoracic vertebra, initial encounter ([facility] 01/06/2021     Priority: Medium    Acute right-sided low back pain without sciatica 01/06/2021     Priority: Medium    Asthma 02/20/2012     Priority: Medium         Hypokalemia    Plan:     Replace potassium. Consult ID for antibiotic management of pneumonia.

## 2021-01-07 NOTE — PROGRESS NOTES
RN called, Stated that PTs SpO2 was in the 80s, When I arrived, SpO2 was 82%, pt was off her O2, Nasal cannula and NRB. Pt was clearing her nose and coughing. Pt was placed on the NRB. SpO2 was still in the 80s. BiPAP was trialed for about 10mins, pt was unable to tolerate the mask on her face. NRB reapplied, with 6L via Nasal cannula. SpO2 increased to 89-90%. Pt was asked to not remove oxygen until the RN is in the Room with her. PT agreed.

## 2021-01-07 NOTE — PROGRESS NOTES
Date: 1/7/2021    Time: 7:50 AM    Patient Placed On BIPAP/CPAP/ Non-Invasive Ventilation? No, found on BiPAP    If no must comment. Facial area red/color change? No           If YES are Blister/Lesion present? Yes   If yes must notify nursing staff  BIPAP/CPAP skin barrier?   Yes   Skin barrier type:mepilexlite       Comments:        Ina Schmidt

## 2021-01-07 NOTE — PROGRESS NOTES
INPATIENT CARDIOLOGY FOLLOW-UP    Name: Ion Shepard    Age: 70 y.o. Date of Admission: 1/6/2021  5:09 AM    Date of Service: 1/7/2021    Interim History:  Still in Afib RVR. Review of Systems:   Cardiac: As per HPI  General: No fever, chills  Pulmonary: As per HPI  HEENT: No visual disturbances, difficult swallowing  GI: No nausea, vomiting  Endocrine: No thyroid disease or DM  Musculoskeletal: JERONIMO x 4, no focal motor deficits  Skin: Intact, no rashes  Neuro/Psych: No headache or seizures    Problem List:  Patient Active Problem List   Diagnosis    Syncope    Asthma    [facility]    Hyponatremia    Atrial fibrillation with RVR ([facility]    Pneumonia    Moderate protein-calorie malnutrition ([facility]    COPD (chronic obstructive pulmonary disease) ([facility]    Hyperglobulinemia    Non-traumatic compression fracture of T12 thoracic vertebra, initial encounter ([facility]    Acute right-sided low back pain without sciatica       Allergies:   Allergies   Allergen Reactions    Avelox [Moxifloxacin Hcl In Nacl] Hives, Swelling and Rash    Codeine Shortness Of Breath and Rash    Ceclor [Cefaclor]        Current Medications:  Current Facility-Administered Medications   Medication Dose Route Frequency Provider Last Rate Last Admin    potassium chloride (KLOR-CON M) extended release tablet 40 mEq  40 mEq Oral BID  Pretty Tobias MD   40 mEq at 01/07/21 1031    metoprolol tartrate (LOPRESSOR) tablet 25 mg  25 mg Oral TID Valerie Turner MD       Dannielle Trinidad Brothers ON 1/8/2021] enoxaparin (LOVENOX) injection 50 mg  1 mg/kg Subcutaneous Daily Valerie Turner MD        dilTIAZem 100 mg in dextrose 5 % 100 mL infusion ([facility])  5 mg/hr Intravenous Continuous Pretty Tobias MD 15 mL/hr at 01/07/21 0556 15 mg/hr at 01/07/21 0556    ipratropium-albuterol (DUONEB) nebulizer solution 1 ampule  1 ampule Inhalation Q4H PRN Pretty Tobias MD   1 ampule at 01/06/21 2002    montelukast (SINGULAIR) tablet 10 mg  10 mg Oral Nightly Barry Rosenberg MD   10 mg at 01/07/21 0028    zolpidem (AMBIEN) tablet 5 mg  5 mg Oral Nightly PRN Barry Rosenberg MD        sodium chloride flush 0.9 % injection 10 mL  10 mL Intravenous 2 times per day Barry Rosenberg MD   10 mL at 01/07/21 1032    sodium chloride flush 0.9 % injection 10 mL  10 mL Intravenous PRN Barry Rosenberg MD        promethazine (PHENERGAN) tablet 12.5 mg  12.5 mg Oral Q6H PRN Barry Rosenberg MD        Or    ondansetron [facility]) injection 4 mg  4 mg Intravenous Q6H PRN Barry Rosenberg MD        polyethylene glycol [facility]) packet 17 g  17 g Oral Daily PRN Barry Rosenberg MD        acetaminophen (TYLENOL) tablet 650 mg  650 mg Oral Q6H PRN Barry Rosenberg MD        Or    acetaminophen (TYLENOL) suppository 650 mg  650 mg Rectal Q6H PRN Barry Rosenberg MD        famotidine (PEPCID) tablet 20 mg  20 mg Oral BID Barry Rosenberg MD   20 mg at 01/07/21 1031    HYDROcodone-acetaminophen (NORCO) 5-325 MG per tablet 1 tablet  1 tablet Oral Q6H PRN Barry Rosenberg MD        HYDROcodone-acetaminophen [facility]) 5-325 MG per tablet 2 tablet  2 tablet Oral Q6H PRN Barry Rosenberg MD   2 tablet at 01/06/21 2042    budesonide (PULMICORT) nebulizer suspension 500 mcg  0.5 mg Nebulization BID Barry Rosenberg MD   500 mcg at 01/07/21 0746    Arformoterol Tartrate (BROVANA) nebulizer solution 15 mcg  15 mcg Nebulization BID Barry Rosenberg MD   15 mcg at 01/07/21 0747    metoprolol (LOPRESSOR) injection 5 mg  5 mg Intravenous Q5 Min PRN Davy Bryan MD   5 mg at 01/06/21 1206    dextrose 5 % and 0.45 % NaCl with KCl 20 mEq infusion   Intravenous Continuous Barry Rosenberg MD 75 mL/hr at 01/07/21 0950 New Bag at 01/07/21 0950    azithromycin (ZITHROMAX) 500 mg in D5W 250ml addavial  500 mg Intravenous Q24H Suzzette Heimlich, MD   Stopped at 01/06/21 2100      dilTIAZem 15 mg/hr (01/07/21 0556)    dextrose 5% and 0.45% NaCl with KCl 20 mEq 75 mL/hr at 01/07/21 3554       Physical Exam:  /64   Pulse 107   Temp 98 °F (36.7 °C) (Axillary)   Resp 30   Ht 5' 7\" (1.702 m)   Wt 120 lb 4 oz (54.5 kg)   SpO2 93%   BMI 18.83 kg/m²   Wt Readings from Last 3 Encounters:   01/07/21 120 lb 4 oz (54.5 kg)   08/26/15 141 lb 9.6 oz (64.2 kg)   08/12/14 143 lb (64.9 kg)   Appearance: Awake, alert, in acute respiratory distress  Skin: Intact, no rash  Head: Normocephalic, atraumatic  Neck: Supple, no carotid bruits  Lungs: Bilateral rhonchi. Cardiac: Irregular and tachycardic  Abdomen: Soft, +bowel sounds  Extremities: Moves all extremities x 4, no lower extremity edema  Neurologic: No focal motor deficits apparent, normal mood and affect    Intake/Output:    Intake/Output Summary (Last 24 hours) at 1/7/2021 1107  Last data filed at 1/7/2021 0906  Gross per 24 hour   Intake 0 ml   Output --   Net 0 ml     No intake/output data recorded.     Laboratory Tests:  Recent Labs     01/06/21  0541 01/07/21  0411   * 129*   K 3.6 3.1*   CL 96* 102   CO2 19* 19*   BUN 10 10   CREATININE 0.6 0.4*   GLUCOSE 82 131*   CALCIUM 9.3 8.8     No results found for: MG  Recent Labs     01/06/21  0541   ALKPHOS 105*   ALT 15   AST 25   PROT 8.4*   BILITOT 0.5   LABALBU 2.0*     Recent Labs     01/06/21  0541 01/07/21  0411   WBC 14.3* 21.4*   RBC 2.97* 2.61*   HGB 10.7* 9.1*   HCT 30.3* 27.7*   .0* 106.1*   MCH 36.0* 34.9   MCHC 35.3* 32.9   RDW 15.6* 15.9*    242   MPV 10.2 10.6     Lab Results   Component Value Date    CKTOTAL 51 02/19/2012    CKMB 0.9 02/18/2012    TROPONINI <0.01 01/06/2021    TROPONINI <0.01 02/19/2012    TROPONINI <0.01 02/18/2012     No results found for: INR, PROTIME  Lab Results   Component Value Date    TSH 0.756 02/19/2012     No results found for: LABA1C  No results found for: EAG  No results found for: CHOL  No results found for: TRIG  No results found for: HDL  No results found for: LDLCALC, LDLCHOLESTEROL  No results found for: LABVLDL, VLDL  No results found for: MG  Recent Labs     01/06/21  0541   PROBNP 2,336*       ASSESSMENT / PLAN:  82-year-old female with past medical history of remote smoking, COPD, hypertension presents with picture of pneumonia found to be in atrial fibrillation associated with symptoms of palpitations, lightheadedness and fatigue and shortness of breath. She was in rapid ventricular response up to 170 bpm.  Lab and imaging work-up were also significant for hypercalcemia 10.9 after correction for low albumin, low albumin level 2 and high protein level 8.4, hyponatremia 127, macrocytic anemia 10.7 and evidence of spinal fractures. Since admission, started on diltiazem drip, Abx and breathing treatments. Oncology was consulted for evaluation of possible MM.     Recommendations  Continue diltiazem 15/ hour. Start metoprolol 25 mg TID. Start AC enoxaparin BID.      Shekhar Hearn MD  [facility]) Cardiology

## 2021-01-07 NOTE — PROGRESS NOTES
Message sent regarding increased confusion and increasign oxygen requirements to Dr. Regina Arriaga. Awaiting rsponse back.

## 2021-01-07 NOTE — PROGRESS NOTES
Subjective:  Patient is now confused, requiring bedside sitter. She will be transferred to ICU for management d/t to mental status change and 02 requirements. She was on the BiPAP but felt like the mask was causing her to suffocate. Objective:    /64   Pulse 100   Temp 98 °F (36.7 °C) (Axillary)   Resp (!) 32   Ht 5' 7\" (1.702 m)   Wt 120 lb 4 oz (54.5 kg)   SpO2 93%   BMI 18.83 kg/m²     General: NAD, confused  HEENT: No thrush or mucositis, EOMI, PERRLA  Heart:  RRR, no murmurs, gallops, or rubs.   Lungs:  + wheeze, rales or + rhonchi  Abd: bowel sounds present, nontender, nondistended, no masses  Extrem:  No clubbing, cyanosis, or edema  Lymphatics: No palpable adenopathy in cervical and supraclavicular regions  Skin: Intact, no petechia or purpura    CBC with Differential:    Lab Results   Component Value Date    WBC 21.4 01/07/2021    RBC 2.61 01/07/2021    HGB 9.1 01/07/2021    HCT 27.7 01/07/2021     01/07/2021    .1 01/07/2021    MCH 34.9 01/07/2021    MCHC 32.9 01/07/2021    RDW 15.9 01/07/2021    SEGSPCT 63 02/21/2012    METASPCT 3.0 01/06/2021    LYMPHOPCT 2.3 01/07/2021    MONOPCT 0.8 01/07/2021    MYELOPCT 1.0 01/06/2021    BASOPCT 0.6 01/07/2021    MONOSABS 0.17 01/07/2021    LYMPHSABS 0.50 01/07/2021    EOSABS 0.02 01/07/2021    BASOSABS 0.12 01/07/2021     CMP:    Lab Results   Component Value Date     01/07/2021    K 3.1 01/07/2021    K 3.6 01/06/2021     01/07/2021    CO2 19 01/07/2021    BUN 10 01/07/2021    CREATININE 0.4 01/07/2021    GFRAA >60 01/07/2021    LABGLOM >60 01/07/2021    GLUCOSE 131 01/07/2021    GLUCOSE 114 02/21/2012    PROT 8.4 01/06/2021    LABALBU 2.0 01/06/2021    LABALBU 3.2 02/20/2012    CALCIUM 8.8 01/07/2021    BILITOT 0.5 01/06/2021    ALKPHOS 105 01/06/2021    AST 25 01/06/2021    ALT 15 01/06/2021        Current Facility-Administered Medications:     potassium chloride (KLOR-CON M) extended release tablet 40 mEq, 40 mEq, Oral, BID WC, Myesha Llamas MD, 40 mEq at 01/07/21 1031    metoprolol tartrate (LOPRESSOR) tablet 25 mg, 25 mg, Oral, TID, Bhavesh Bee MD, 25 mg at 01/07/21 1404    enoxaparin (LOVENOX) injection 50 mg, 1 mg/kg, Subcutaneous, BID, Bhavesh Bee MD    cefTRIAXone (ROCEPHIN) 2 g in sterile water 20 mL IV syringe, 2 g, Intravenous, Q24H, Livan Wolf MD    0.9% NaCl with KCl 40 mEq infusion, , Intravenous, Continuous, Naina Saldana MD    ipratropium-albuterol (DUONEB) nebulizer solution 1 ampule, 1 ampule, Inhalation, Q4H PRN, Myesha Llamas MD, 1 ampule at 01/06/21 2002    montelukast (SINGULAIR) tablet 10 mg, 10 mg, Oral, Nightly, Myesha Llamas MD, 10 mg at 01/07/21 0028    zolpidem (AMBIEN) tablet 5 mg, 5 mg, Oral, Nightly PRN, Myesha Llamas MD    sodium chloride flush 0.9 % injection 10 mL, 10 mL, Intravenous, 2 times per day, Myesha Llamas MD, 10 mL at 01/07/21 1032    sodium chloride flush 0.9 % injection 10 mL, 10 mL, Intravenous, PRN, Myesha Llamas MD    promethazine (PHENERGAN) tablet 12.5 mg, 12.5 mg, Oral, Q6H PRN **OR** ondansetron (ZOFRAN) injection 4 mg, 4 mg, Intravenous, Q6H PRN, Myesha Llamas MD    polyethylene glycol [facility]) packet 17 g, 17 g, Oral, Daily PRN, Myesha Llamas MD    acetaminophen (TYLENOL) tablet 650 mg, 650 mg, Oral, Q6H PRN, 650 mg at 01/07/21 1237 **OR** acetaminophen (TYLENOL) suppository 650 mg, 650 mg, Rectal, Q6H PRN, Myesha Llamas MD    famotidine (PEPCID) tablet 20 mg, 20 mg, Oral, BID, Myesha Llamas MD, 20 mg at 01/07/21 1031    HYDROcodone-acetaminophen (NORCO) 5-325 MG per tablet 1 tablet, 1 tablet, Oral, Q6H PRN, Myesha Llamas MD    HYDROcodone-acetaminophen [facility]) 5-325 MG per tablet 2 tablet, 2 tablet, Oral, Q6H PRN, Myesha Llamas MD, 2 tablet at 01/06/21 2042    budesonide (PULMICORT) nebulizer suspension 500 mcg, 0.5 mg, Nebulization, BID, Myesha Llamas MD, 500 mcg at 01/07/21 0746    Arformoterol Tartrate (BROVANA)

## 2021-01-07 NOTE — PROGRESS NOTES
8:44 AM  Consult called to Poonam Clark in [facility] office.   Neel Dunn, [facility]  1/7/2021

## 2021-01-07 NOTE — PROGRESS NOTES
Critical Care Team - Daily Progress Note         Date and time: 1/7/2021 3:29 PM  Patient's name:  Nydia Levy  Medical Record Number: [id number]  Patient's account/billing number: [de-identified]  Patient's YOB: 1945  Age: 70 y.o. Date of Admission: 1/6/2021  5:09 AM  Length of stay during current admission: 1      Primary Care Physician: Oneyda Herndon MD  ICU Attending Physician: Dr. Andres Oropeza Status: Full Code    Reason for ICU admission:     Acute hypoxic and hypercapnic respiratory failure  Mental status      SUBJECTIVE:     Patient transferred to the ICU this afternoon. On the floor she was altered mental status agitated. An ABG showed pH of 7.24/PCO2 45.6/PaO2 of 77.8/bicarb of 19.1. Patient is confused and agitated. Sitter was sitting at her bedside trying to redirect her constantly. She converted back into normal sinus rhythm with a heart rate of 80s to 100 still on Cardizem drip and 50 mg/h.       CURRENT VENTILATION STATUS:     [] Ventilator  [x] BIPAP  [] Nasal Cannula [] Room Air      IF INTUBATED, ET TUBE MARKING AT LOWER LIP:       cms    SECRETIONS Amount:  [] Small [] Moderate  [] Large  [x] None  Color:     [] White [] Colored  [] Bloody    SEDATION:  RAAS Score:  [x]Precedex gtt  [] Versed gtt  [] Ativan gtt   [] No Sedation    PARALYZED:  [x] No    [] Yes      VASOPRESSORS:  [x] No    [] Yes    If yes -   [] Levophed       [] Dopamine     [] Vasopressin       [] Dobutamine  [] Phenylephrine         [] Epinephrine    CENTRAL LINES:     [x] No   [] Yes   (Date of Insertion:   )           If yes -     [] Right IJ     [] Left IJ [] Right Femoral [] Left Femoral                   [] Right Subclavian [] Left Subclavian       LEES'S CATHETER:   [] No   [x] Yes  (Date of Insertion:   )     URINE OUTPUT:            [x] Good   [] Low              [] Anuric      OBJECTIVE:     VITAL SIGNS:  /64   Pulse 100   Temp 98 °F (36.7 °C) (Axillary)   Resp (!) 32   Ht 5' 7\" (1.702 m)   Wt 120 lb 4 oz (54.5 kg)   SpO2 93%   BMI 18.83 kg/m²   Tmax over 24 hours:  Temp (24hrs), Av.3 °F (36.8 °C), Min:98 °F (36.7 °C), Max:98.7 °F (37.1 °C)      Patient Vitals for the past 6 hrs:   BP Temp Temp src Pulse Resp   21 1400 138/64 98 °F (36.7 °C) Axillary 100 (!) 32         Intake/Output Summary (Last 24 hours) at 2021 1529  Last data filed at 2021 1300  Gross per 24 hour   Intake 0 ml   Output --   Net 0 ml     Wt Readings from Last 2 Encounters:   21 120 lb 4 oz (54.5 kg)   08/26/15 141 lb 9.6 oz (64.2 kg)     Body mass index is 18.83 kg/m². PHYSICAL EXAMINATION:    GENERAL: Patient is awake but confused and disoriented agitated  HEENT: Atraumatic. Normocephalic. Extraocular muscles are intact. Pupils are equal, round and reactive to light and accommodation. Sclera is nonicteric. NECK: Supple. No JVD. No adenopathy. No bruits. CARDIOVASCULAR: Regular rate and rhythm. No murmur, gallop or thrills. LUNGS: Coarse bronchial sounds on the right lung  ABDOMEN: Soft, nontender. No distention or organomegaly. EXTREMITIES: No clubbing, no cellulitis. Positive peripheral pulses, equal bilaterally.         Any additional physical findings:    MEDICATIONS:    Scheduled Meds:   potassium chloride  40 mEq Oral BID WC    metoprolol tartrate  25 mg Oral TID    enoxaparin  1 mg/kg Subcutaneous BID    cefTRIAXone (ROCEPHIN) IV  2 g Intravenous Q24H    montelukast  10 mg Oral Nightly    sodium chloride flush  10 mL Intravenous 2 times per day    famotidine  20 mg Oral BID    budesonide  0.5 mg Nebulization BID    Arformoterol Tartrate  15 mcg Nebulization BID     Continuous Infusions:   0.9% NaCl with KCl 40 mEq 100 mL/hr at 21 1452    dexmedetomidine (PRECEDEX) IV infusion       PRN Meds:       ipratropium-albuterol, 1 ampule, Q4H PRN      zolpidem, 5 mg, Nightly PRN      sodium chloride flush, 10 mL, PRN      promethazine, 12.5 mg, Q6H PRN    Or     ondansetron, 4 mg, Q6H PRN      polyethylene glycol, 17 g, Daily PRN      acetaminophen, 650 mg, Q6H PRN    Or      acetaminophen, 650 mg, Q6H PRN      HYDROcodone 5 mg - acetaminophen, 1 tablet, Q6H PRN      HYDROcodone 5 mg - acetaminophen, 2 tablet, Q6H PRN      metoprolol, 5 mg, Q5 Min PRN          VENT SETTINGS (Comprehensive) (if applicable):  Vent Information  Skin Assessment: Clean, dry, & intact  FiO2 : 65 %  SpO2: 93 %  SpO2/FiO2 ratio: 143.08  Mask Type: Full face mask  Mask Size: Medium  Additional Respiratory  Assessments  Pulse: 100  Resp: (!) 32  SpO2: 93 %    ABGs:   Recent Labs     01/07/21  1326   PH 7.241*   PCO2 45.6*   PO2 77.8   HCO3 19.1*   BE -8.0*   O2SAT 93.6       Laboratory findings:    Complete Blood Count:   Recent Labs     01/06/21  0541 01/07/21  0411   WBC 14.3* 21.4*   HGB 10.7* 9.1*   HCT 30.3* 27.7*    242        Last 3 Blood Glucose:   Recent Labs     01/06/21  0541 01/07/21  0411   GLUCOSE 82 131*        PT/INR:  No results found for: PROTIME, INR  PTT:  No results found for: APTT, PTT    Comprehensive Metabolic Profile:   Recent Labs     01/06/21  0541 01/07/21  0411   * 129*   K 3.6 3.1*   CL 96* 102   CO2 19* 19*   BUN 10 10   CREATININE 0.6 0.4*   GLUCOSE 82 131*   CALCIUM 9.3 8.8   PROT 8.4*  --    LABALBU 2.0*  --    BILITOT 0.5  --    ALKPHOS 105*  --    AST 25  --    ALT 15  --       Magnesium: No results found for: MG  Phosphorus: No results found for: PHOS  Ionized Calcium: No results found for: CAION     Urinalysis:     Troponin:   Recent Labs     01/06/21  0541   TROPONINI <0.01       Microbiology:    Cultures during this admission:     Blood cultures:                 [] None drawn      [] Negative             [x]  Positive (Details:  Blood Culture, Routine Abnormal  01[facility]06/2021  9:13 [facility]   Gram stain performed from blood culture bottle media   Gram positive cocci in chains        )  Urine Culture: [] None drawn      [] Negative             []  Positive (Details:  )  Sputum Culture:               [] None drawn       [] Negative             []  Positive (Details:  )   Endotracheal aspirate:     [] None drawn       [] Negative             []  Positive (Details:  )     Other pertinent Labs:       Radiology/Imaging:     Chest Xray (1/7/2021): No imaging today    ASSESSMENT:     Principal Problem:    Acute respiratory failure with hypoxia and hypercapnia ([facility])    Active Problems:    Atrial fibrillation with RVR ([facility])  Sepsis,  invasive streptococcal infection  2 lobar community-acquired pneumonia    COPD (chronic obstructive pulmonary disease) ([facility])    Hyperglobulinemia    Asthma    Hyponatremia    Moderate protein-calorie malnutrition ([facility])    Non-traumatic compression fracture of T12 thoracic vertebra, initial encounter ([facility] [address]    Acute right-sided low back pain without sciatica        Additional assessment:    ·         PLAN:     WEAN PER PROTOCOL:  [] No   [] Yes  [x] N/A    DISCONTINUE ANY LABS:   [x] No   [] Yes    ICU PROPHYLAXIS:  Stress ulcer:  [x] PPI Agent  [] Q7Hwtrv [] Sucralfate  [] Other:  VTE:   [x] Enoxaparin  [] Unfract. Heparin Subcut  [] EPC Cuffs    NUTRITION:  [] NPO [] Tube Feeding (Specify: ) [] TPN  [] PO (Diet: DIET GENERAL;  Dietary Nutrition Supplements: Standard High Calorie Oral Supplement)    HOME MEDICATIONS RECONCILED: [x] No  [] Yes    INSULIN DRIP:   [x] No   [] Yes    CONSULTATION NEEDED:  [] No   [x] Yes    FAMILY UPDATED:    [x] No   [] Yes    TRANSFER OUT OF ICU:   [x] No   [] Yes    ADDITIONAL PLAN:  1. Continue NIV. Will repeat ABG in 2 hours  2. Continue Rocephin per infectious disease recommendations follow final cultures  3. We will start patient on Precedex for sedation  4. Continue bronchodilators, Brovana Pulmicort and DuoNebs  5.  We will switch fluids to normal saline and 40 of KCl at 100 cc/h  6. Wean Cardizem drip off, currently normal sinus rhythm started on metoprolol 25 mg 3 times daily we will hold for systolic blood pressure below 100 and heart rate below 60s. 7. Hypercalcemia work-up per primary otology oncology, SPEP, LDH and immunofixation assay pending        Hanh Fuentes M.D. Kiesha ARIAS P                     1/7/2021, 3:29 PM  CCT excluding procedures:42'    NOTE: This report, in part or full, may have been transcribed using voice recognition software. Every effort was made to ensure accuracy; however, inadvertent computerized transcription errors may be present. Please excuse any transcriptional grammatical or spelling errors that may have escaped my editorial review.

## 2021-01-07 NOTE — CARE COORDINATION
Social Work/Discharge Planning:  Chart reviewed. COVID negative 1/6. Patient confused and has a sitter. Patient on bipap and to transfer to ICU. Called patient son Brandon Alcantara" (ph: [phone]) and left a message to discuss discharge planning.   Electronically signed by ANGEL Blair on 1/7/2021 at 1:58 PM

## 2021-01-07 NOTE — CONSULTS
[address] [place] [address] [facility]  Consultation Note     Admit Date: 1/6/2021  5:09 AM    Reason for Consult:   Necrotizing pneumonia    Attending Physician:  Darell Candelario MD    HISTORY OF PRESENT ILLNESS:             The history is obtained from extensive review of available past medical records. The patient is a 70 y.o. female who presents to the ED on 1/6/2020 with complaints of generalized weakness, shortness of breath, dry cough, diarrhea. She says she had the symptoms for 1 day but apparently she has been sick for longer than that. The chest x-ray showed widening of the right chest.  CT confirmed a right upper and lower pneumonia. She was treated with Zosyn, Vancomycin, followed by Azithromycin. She was seen in consultation by pulmonary. Dr. Gwendolyn Sarmiento in turn, asked for an ID consult but this was never called in. Blood cultures have turned positive for GPC in chains in 4 bottles. Urine antigen is positive for Pneumococcus.     Past Medical History:        Diagnosis Date    Asthma     Thyroid disease      Past Surgical History:        Procedure Laterality Date    CHOLECYSTECTOMY      DENTAL SURGERY      to prepared for bridge to be placed    HYSTERECTOMY      THYROID SURGERY       Current Medications:   Scheduled Meds:   potassium chloride  40 mEq Oral BID WC    metoprolol tartrate  25 mg Oral TID    enoxaparin  1 mg/kg Subcutaneous BID    cefTRIAXone (ROCEPHIN) IV  2 g Intravenous Q24H    diphenhydrAMINE  50 mg Intravenous Once    montelukast  10 mg Oral Nightly    sodium chloride flush  10 mL Intravenous 2 times per day    famotidine  20 mg Oral BID    budesonide  0.5 mg Nebulization BID    Arformoterol Tartrate  15 mcg Nebulization BID     Continuous Infusions:   dilTIAZem 15 mg/hr (01/07/21 1247)    dextrose 5% and 0.45% NaCl with KCl 20 mEq 75 mL/hr at 01/07/21 0950     PRN Meds:ipratropium-albuterol, zolpidem, sodium chloride flush, promethazine **OR** ondansetron, polyethylene glycol, acetaminophen **OR** acetaminophen, HYDROcodone 5 mg - acetaminophen, HYDROcodone 5 mg - acetaminophen, metoprolol    Allergies: Avelox [moxifloxacin hcl in nacl], Codeine, and Ceclor [cefaclor]    Social History:   Social History     Socioeconomic History    Marital status:      Spouse name: None    Number of children: None    Years of education: None    Highest education level: None   Occupational History    None   Social Needs    Financial resource strain: None    Food insecurity     Worry: None     Inability: None    Transportation needs     Medical: None     Non-medical: None   Tobacco Use    Smoking status: Former Smoker     Quit date: 2010     Years since quittin.0    Smokeless tobacco: Never Used   Substance and Sexual Activity    Alcohol use: No    Drug use: No    Sexual activity: None   Lifestyle    Physical activity     Days per week: None     Minutes per session: None    Stress: None   Relationships    Social connections     Talks on phone: None     Gets together: None     Attends Evangelical service: None     Active member of club or organization: None     Attends meetings of clubs or organizations: None     Relationship status: None    Intimate partner violence     Fear of current or ex partner: None     Emotionally abused: None     Physically abused: None     Forced sexual activity: None   Other Topics Concern    None   Social History Narrative    None      The patient is a retired nurse    Family History:   History reviewed. No pertinent family history. . Otherwise non-pertinent to the chief complaint.     REVIEW OF SYSTEMS:    Constitutional: Negative for fevers, chills, diaphoresis  Neurologic: Confusion  Psychiatric: Negative  Rheumatologic: Negative   Endocrine: Negative  Hematologic: Negative  Immunologic: Negative  ENT: Negative  Respiratory: As in the HPI  Cardiovascular: Negative  GI: Negative  : Negative  Musculoskeletal: Negative  Skin: No rashes. PHYSICAL EXAM:    Vitals:   /64   Pulse 107   Temp 98 °F (36.7 °C) (Axillary)   Resp 30   Ht 5' 7\" (1.702 m)   Wt 120 lb 4 oz (54.5 kg)   SpO2 93%   BMI 18.83 kg/m²   Constitutional: The patient is awake, alert, and partially oriented. Having difficulty speaking. She is on a BiPAP. Sitter present. Skin: Warm and dry. No rashes were noted. HEENT: Eyes show round, and reactive pupils. No jaundice. Moist mucous membranes, no ulcerations, no thrush. Neck: Supple to movements. No lymphadenopathy. Chest: On a BiPAP. Using accessory muscles to breathe. Scattered crackles right lung posteriorly with egophony. Cardiovascular: S1 and S2 are rhythmic and regular. No murmurs appreciated. Abdomen: Positive bowel sounds to auscultation. Benign to palpation. No masses felt. No hepatosplenomegaly. Extremities: No clubbing, no cyanosis, minimal edema.   Lines: peripheral      CBC+dif:  Recent Labs     01/06/21  0541 01/07/21  0411   WBC 14.3* 21.4*   HGB 10.7* 9.1*   HCT 30.3* 27.7*   .0* 106.1*    242   NEUTROABS 14.16* 20.38*     No results found for: CRP   No results found for: CRPHS  No results found for: SEDRATE  Lab Results   Component Value Date    ALT 15 01/06/2021    AST 25 01/06/2021    ALKPHOS 105 (H) 01/06/2021    BILITOT 0.5 01/06/2021     Lab Results   Component Value Date     01/07/2021    K 3.1 01/07/2021    K 3.6 01/06/2021     01/07/2021    CO2 19 01/07/2021    BUN 10 01/07/2021    CREATININE 0.4 01/07/2021    GFRAA >60 01/07/2021    LABGLOM >60 01/07/2021    GLUCOSE 131 01/07/2021    GLUCOSE 114 02/21/2012    PROT 8.4 01/06/2021    LABALBU 2.0 01/06/2021    LABALBU 3.2 02/20/2012    CALCIUM 8.8 01/07/2021    BILITOT 0.5 01/06/2021    ALKPHOS 105 01/06/2021    AST 25 01/06/2021    ALT 15 01/06/2021       No results found for: PROTIME, INR    Lab Results   Component Value Date    TSH 0.756 02/19/2012       Lab Results   Component Value Date    COLORU YELLOW 02/19/2012    PHUR 7.0 02/19/2012    WBCUA 2-5 02/19/2012    RBCUA 1-3 02/19/2012    BACTERIA RARE 02/19/2012    CLARITYU CLEAR 02/19/2012    SPECGRAV 1.020 02/19/2012    LEUKOCYTESUR NEGATIVE 02/19/2012    UROBILINOGEN 0.2 02/19/2012    BILIRUBINUR NEGATIVE 02/19/2012    BLOODU NEGATIVE 02/19/2012    GLUCOSEU NEGATIVE 02/19/2012       Lab Results   Component Value Date    HCO3 19.1 01/07/2021    BE -8.0 01/07/2021    O2SAT 93.6 01/07/2021    PH 7.241 01/07/2021    PCO2 45.6 01/07/2021    PO2 77.8 01/07/2021     Radiology:  Noted    Microbiology:  Pending  Recent Labs     01/06/21  0913   BC Gram stain performed from blood culture bottle media  Gram positive cocci in chains  *     No results for input(s): ORG in the last 72 hours. Recent Labs     01/06/21  0913   BLOODCULT2 Gram stain performed from blood culture bottle media  Gram positive cocci in chains  *     Recent Labs     01/06/21  1842   STREPNEUMAGU Urine specimen POSITIVE for Pneumococcal pneumonia. Normal Range: Presumptive Negative       Recent Labs     01/06/21  1842   LP1UAG Presumptive Negative -suggesting no recent or current infections  with Legionella pneumophila serogroup 1. Infection to Legionella cannot be ruled out since other serogroups  and species may cause infection, antigen may not be present in  early infection, or level of antigen may be below the  detection limit. Normal Range: Presumptive Negative       No results for input(s): ASO in the last 72 hours. No results for input(s): CULTRESP in the last 72 hours. Recent Labs     01/07/21  0411   PROCAL 3.04*       Assessment:  · Community-acquired pneumonia right upper and lower lobes  · Acute respiratory failure associated to above  · Leukocytosis secondary to pneumonia  · Invasive pneumococcal disease    Plan:    · Change Azithromycin over to Ceftriaxone.   We will give her Benadryl before the first dose because of history of allergy to cefaclor  · Check

## 2021-01-08 NOTE — PLAN OF CARE
Problem: Infection:  Goal: Will remain free from infection  Description: Will remain free from infection  Outcome: Met This Shift     Problem: Safety:  Goal: Free from intentional harm  Description: Free from intentional harm  Outcome: Met This Shift     Problem: Pain:  Goal: Patient's pain/discomfort is manageable  Description: Patient's pain/discomfort is manageable  Outcome: Met This Shift     Problem: Skin Integrity:  Goal: Skin integrity will stabilize  Description: Skin integrity will stabilize  Outcome: Met This Shift

## 2021-01-08 NOTE — PROGRESS NOTES
12-- Pt's son & daughter at bedside. Pts belongs- including a purse, cell phone & , clothing, hearing aid & silver toe ring given to pts family members. Dentures, glasses & 1 silver ring remain with pt.

## 2021-01-08 NOTE — PATIENT CARE CONFERENCE
Dr. Serene Tang was called and notified of the following findings:  patient is unresponsive with non-reactive pupils; no pulses palpated; no spontaneous breaths; no heart sounds auscultated. Dr. Serene Tang pronounced patient dead at [facility] on 01/08/2021. Dr. Flynn To will sign the death certificate.

## 2021-01-08 NOTE — PROGRESS NOTES
Date: 1/7/2021    Time: 8:46 PM    Patient Placed On BIPAP/CPAP/ Non-Invasive Ventilation? No    If no must comment. Facial area red/color change? No           If YES are Blister/Lesion present? No   If yes must notify nursing staff  BIPAP/CPAP skin barrier? Yes    Skin barrier type:mepilexlite       Comments: Pt remains on AVAPS at this time.           Benjaman Linear    01/07/21 2045   NIV Type   Mode AVAPS   Mask Type Full face mask   Mask Size Medium   Settings/Measurements   CPAP/EPAP 10 cmH2O   IPAP Min 11 cmH2O   IPAP Max 16 cmH2O   Vt Ordered 450 mL   Rate Ordered 14   Resp 26   FiO2  80 %   Vt Exhaled 590 mL   Minute Volume 15.1 Liters   Mask Leak (lpm) 42 lpm   Comfort Level Good   Using Accessory Muscles No   SpO2 93

## 2021-01-09 LAB — MRSA CULTURE ONLY: NORMAL

## 2021-01-13 NOTE — DISCHARGE SUMMARY
Physician Discharge Summary     Patient ID:  Elza Romero  [id number]  56 y.o.  1945    Admit date: 1/6/2021    Discharge date and time: 1/8/2021  8:21 AM     Admitting Physician: Donna Briseno MD     Consulting physicians:Dr. Eddie Greene, Dr. Neelam Hong, [address] [facility] cardiology    Admission Diagnoses: Acute respiratory failure with hypoxia [facility]) [J96.01]    Discharge Diagnoses:   Acute respiratory failure with hypoxia ([facility] 01/06/2021        Priority: High    Atrial fibrillation with RVR ([facility] 01/06/2021       Priority: High    Pneumonia 01/06/2021       Priority: High    COPD (chronic obstructive pulmonary disease) [facility] 01/06/2021       Priority: High    Hyperglobulinemia 01/06/2021       Priority: High    Syncope 02/20/2012       Priority: High    Hyponatremia 01/06/2021       Priority: Medium    Moderate protein-calorie malnutrition ([facility] 01/06/2021       Priority: Medium    Non-traumatic compression fracture of T12 thoracic vertebra, initial encounter [facility] 01/06/2021       Priority: Medium    Acute right-sided low back pain without sciatica 01/06/2021       Priority: Medium    Asthma 02/20/2012       Priority: Medium         Hypokalemia         Hospital Course: Patient was placed on Rocephin to treat pneumonia, given oxygen to correct hypoxemia, and given breathing treatments. IV fluids administered to correct electrolyte abnormalities. Patient developed progressive hypoxia and hypercarbia and was transferred to intensive care unit. Due to her underlying COPD, it was deemed it would be very difficult to remove patient from ventilator, so her son and daughter believed that in keeping with patient's prior wishes, she should not be placed on ventilator. She had progressive hypoxia and hypercarbia and had respiratory arrest and was pronounced dead at 5:15 am on the day of death. Significant Diagnostic Studies: labs were drawn to evaluate cause of hyperglobulinemia.       Addendum, 3/12/21:  Blood culture grew streptococcus. Sepsis was present, resulting from bacteremia from pneumonia.      Signed:  Fariha Head  1/13/2021  8:46 AM

## 2021-03-13 NOTE — PROGRESS NOTES
Physician Progress Note      PATIENT:               Gifty Henriquez  CSN #:                  [id number]  :                       1945  ADMIT DATE:       2021 5:09 AM  DISCH DATE:        2021 8:21 AM  RESPONDING  PROVIDER #:        Babak Mar MD          QUERY TEXT:    Pt admitted with pneumonia. Noted documentation of sepsis in  pulmonology   PN. If possible, please document in progress notes and discharge summary:    The medical record reflects the following:  Risk Factors: pneumonia, respiratory failure  Clinical Indicators: On admission lactic 2.3, procalcitonin 3.04, WBC 14.3. Pt   declined and was admitted to ICU. Blood cultures positive for Streptococcus   species. Per H&P \"Yesterday she experienced worsening of weakness and   shortness of breath and had diarrhea and a fever with temp up to 102. \"  Per  pulmonology PN \"Sepsis,  invasive streptococcal infection. \"  Treatment: IV abx    Thank you,  Qing Gonzales RN, CCDS  Clinical Documentation Improvement Specialist  [email or web address]  [phone]  Options provided:  -- Streptococcal sepsis confirmed present on admission  -- Streptococcal sepsis confirmed not present on admission  -- Streptococcal sepsis  ruled out  -- Other - I will add my own diagnosis  -- Disagree - Not applicable / Not valid  -- Disagree - Clinically unable to determine / Unknown  -- Refer to Clinical Documentation Reviewer    PROVIDER RESPONSE TEXT:    The diagnosis of Streptococcal sepsis was confirmed as present on admission.     Query created by: Richy Vyas on 3/11/2021 8:10 AM      Electronically signed by:  Babak Mar MD 3/13/2021 5:00 PM